# Patient Record
Sex: MALE | Race: WHITE | Employment: OTHER | URBAN - NONMETROPOLITAN AREA
[De-identification: names, ages, dates, MRNs, and addresses within clinical notes are randomized per-mention and may not be internally consistent; named-entity substitution may affect disease eponyms.]

---

## 2017-01-06 ENCOUNTER — OFFICE VISIT (OUTPATIENT)
Dept: FAMILY MEDICINE CLINIC | Age: 72
End: 2017-01-06

## 2017-01-06 VITALS
SYSTOLIC BLOOD PRESSURE: 126 MMHG | WEIGHT: 261.4 LBS | DIASTOLIC BLOOD PRESSURE: 82 MMHG | OXYGEN SATURATION: 98 % | TEMPERATURE: 97.8 F | HEART RATE: 80 BPM | RESPIRATION RATE: 17 BRPM | HEIGHT: 77 IN | BODY MASS INDEX: 30.86 KG/M2

## 2017-01-06 DIAGNOSIS — Z01.818 PREOP TESTING: ICD-10-CM

## 2017-01-06 DIAGNOSIS — R97.20 ELEVATED PSA: Primary | ICD-10-CM

## 2017-01-06 PROCEDURE — 99213 OFFICE O/P EST LOW 20 MIN: CPT | Performed by: NURSE PRACTITIONER

## 2017-01-06 PROCEDURE — 93000 ELECTROCARDIOGRAM COMPLETE: CPT | Performed by: NURSE PRACTITIONER

## 2017-01-06 ASSESSMENT — ENCOUNTER SYMPTOMS
EYES NEGATIVE: 1
RESPIRATORY NEGATIVE: 1
GASTROINTESTINAL NEGATIVE: 1

## 2017-03-14 ENCOUNTER — HOSPITAL ENCOUNTER (OUTPATIENT)
Dept: MRI IMAGING | Age: 72
Discharge: HOME | End: 2017-03-14
Attending: RADIOLOGY | Admitting: RADIOLOGY

## 2017-03-14 DIAGNOSIS — C61 PROSTATE CANCER (HCC): ICD-10-CM

## 2017-03-14 DIAGNOSIS — C61 MALIGNANT NEOPLASM OF PROSTATE (HCC): ICD-10-CM

## 2017-03-14 LAB
GFR AFRICAN AMERICAN: >60
GFR NON-AFRICAN AMERICAN: >60
PERFORMED ON: NORMAL
POC CREATININE: 1.1 MG/DL (ref 0.8–1.3)
POC SAMPLE TYPE: NORMAL

## 2017-04-25 ENCOUNTER — TELEPHONE (OUTPATIENT)
Dept: OTHER | Facility: CLINIC | Age: 72
End: 2017-04-25

## 2018-01-04 ENCOUNTER — HOSPITAL ENCOUNTER (OUTPATIENT)
Dept: CT IMAGING | Age: 73
Discharge: HOME | End: 2018-01-04
Admitting: FAMILY MEDICINE

## 2018-01-04 DIAGNOSIS — F17.210 CIGARETTE NICOTINE DEPENDENCE, UNCOMPLICATED: ICD-10-CM

## 2018-01-04 DIAGNOSIS — F17.210 CIGARETTE SMOKER: ICD-10-CM

## 2018-03-19 PROBLEM — N23 RENAL COLIC ON RIGHT SIDE: Status: ACTIVE | Noted: 2018-03-19

## 2018-12-07 ENCOUNTER — HOSPITAL ENCOUNTER (OUTPATIENT)
Dept: GENERAL RADIOLOGY | Age: 73
Discharge: HOME | End: 2018-12-07
Payer: COMMERCIAL

## 2018-12-07 ENCOUNTER — HOSPITAL ENCOUNTER (OUTPATIENT)
Age: 73
Discharge: HOME | End: 2018-12-07
Payer: COMMERCIAL

## 2018-12-07 DIAGNOSIS — M54.59 MECHANICAL LOW BACK PAIN: ICD-10-CM

## 2018-12-07 PROCEDURE — 72100 X-RAY EXAM L-S SPINE 2/3 VWS: CPT

## 2021-03-15 ENCOUNTER — HOSPITAL ENCOUNTER (OUTPATIENT)
Dept: NEUROLOGY | Age: 76
Discharge: HOME | End: 2021-03-15
Payer: COMMERCIAL

## 2021-03-15 PROCEDURE — 95910 NRV CNDJ TEST 7-8 STUDIES: CPT

## 2021-03-15 PROCEDURE — 95886 MUSC TEST DONE W/N TEST COMP: CPT

## 2021-03-15 NOTE — PROCEDURES
Conduction Findings: The above EMG needle exam was within normal limits. Nerve conduction studies demonstrate prolongation of both median sensory and left median motor distal latencies. There is borderline slowing of both ulnar nerves across elbows. Overall Impression: Study is consistent with bilateral carpal tunnel syndrome. Left moderate, right mild severity. No evidence of an acute radiculopathy or other lower motor neuron dysfunction  Thank you. Electronically signed by:  Edd Timmons DO,3/15/2021,8:40 AM

## 2021-11-12 ENCOUNTER — HOSPITAL ENCOUNTER (EMERGENCY)
Age: 76
Discharge: ANOTHER HOSPITAL | End: 2021-11-12
Attending: STUDENT IN AN ORGANIZED HEALTH CARE EDUCATION/TRAINING PROGRAM
Payer: COMMERCIAL

## 2021-11-12 ENCOUNTER — HOSPITAL ENCOUNTER (INPATIENT)
Age: 76
LOS: 3 days | Discharge: HOME | DRG: 066 | End: 2021-11-15
Attending: INTERNAL MEDICINE | Admitting: INTERNAL MEDICINE
Payer: COMMERCIAL

## 2021-11-12 ENCOUNTER — APPOINTMENT (OUTPATIENT)
Dept: GENERAL RADIOLOGY | Age: 76
End: 2021-11-12
Payer: COMMERCIAL

## 2021-11-12 ENCOUNTER — APPOINTMENT (OUTPATIENT)
Dept: CT IMAGING | Age: 76
End: 2021-11-12
Payer: COMMERCIAL

## 2021-11-12 VITALS
SYSTOLIC BLOOD PRESSURE: 146 MMHG | RESPIRATION RATE: 15 BRPM | BODY MASS INDEX: 29.47 KG/M2 | OXYGEN SATURATION: 95 % | HEART RATE: 61 BPM | HEIGHT: 76 IN | WEIGHT: 242 LBS | TEMPERATURE: 98.8 F | DIASTOLIC BLOOD PRESSURE: 67 MMHG

## 2021-11-12 DIAGNOSIS — I63.9 CEREBROVASCULAR ACCIDENT (CVA), UNSPECIFIED MECHANISM (HCC): Primary | ICD-10-CM

## 2021-11-12 DIAGNOSIS — I10 HTN (HYPERTENSION), BENIGN: Primary | ICD-10-CM

## 2021-11-12 DIAGNOSIS — R51.9 ACUTE NONINTRACTABLE HEADACHE, UNSPECIFIED HEADACHE TYPE: ICD-10-CM

## 2021-11-12 PROBLEM — G45.9 TIA (TRANSIENT ISCHEMIC ATTACK): Status: ACTIVE | Noted: 2021-11-12

## 2021-11-12 LAB
A/G RATIO: 1.7 (ref 1.1–2.2)
ALBUMIN SERPL-MCNC: 4.3 G/DL (ref 3.4–5)
ALP BLD-CCNC: 68 U/L (ref 40–129)
ALT SERPL-CCNC: 20 U/L (ref 10–40)
ANION GAP SERPL CALCULATED.3IONS-SCNC: 11 MMOL/L (ref 3–16)
AST SERPL-CCNC: 16 U/L (ref 15–37)
BASE EXCESS VENOUS: -1 MMOL/L (ref -3–3)
BASOPHILS ABSOLUTE: 0 K/UL (ref 0–0.2)
BASOPHILS RELATIVE PERCENT: 0.6 %
BETA-HYDROXYBUTYRATE: 0.1 MMOL/L (ref 0–0.27)
BILIRUB SERPL-MCNC: 1.2 MG/DL (ref 0–1)
BILIRUBIN URINE: NEGATIVE
BLOOD, URINE: NEGATIVE
BUN BLDV-MCNC: 19 MG/DL (ref 7–20)
CALCIUM SERPL-MCNC: 9.4 MG/DL (ref 8.3–10.6)
CARBOXYHEMOGLOBIN: 1.5 % (ref 0–1.5)
CHLORIDE BLD-SCNC: 104 MMOL/L (ref 99–110)
CHP ED QC CHECK: NORMAL
CLARITY: CLEAR
CO2: 25 MMOL/L (ref 21–32)
COLOR: YELLOW
CREAT SERPL-MCNC: 1 MG/DL (ref 0.8–1.3)
EKG ATRIAL RATE: 69 BPM
EKG DIAGNOSIS: NORMAL
EKG P AXIS: 54 DEGREES
EKG P-R INTERVAL: 206 MS
EKG Q-T INTERVAL: 398 MS
EKG QRS DURATION: 100 MS
EKG QTC CALCULATION (BAZETT): 426 MS
EKG R AXIS: -9 DEGREES
EKG T AXIS: 38 DEGREES
EKG VENTRICULAR RATE: 69 BPM
EOSINOPHILS ABSOLUTE: 0.1 K/UL (ref 0–0.6)
EOSINOPHILS RELATIVE PERCENT: 1.9 %
GFR AFRICAN AMERICAN: >60
GFR NON-AFRICAN AMERICAN: >60
GLUCOSE BLD-MCNC: 215 MG/DL
GLUCOSE BLD-MCNC: 215 MG/DL (ref 70–99)
GLUCOSE URINE: >=1000 MG/DL
HCO3 VENOUS: 25.6 MMOL/L (ref 23–29)
HCT VFR BLD CALC: 43.7 % (ref 40.5–52.5)
HEMOGLOBIN: 14.1 G/DL (ref 13.5–17.5)
INR BLD: 1.01 (ref 0.88–1.12)
KETONES, URINE: NEGATIVE MG/DL
LEUKOCYTE ESTERASE, URINE: NEGATIVE
LYMPHOCYTES ABSOLUTE: 0.8 K/UL (ref 1–5.1)
LYMPHOCYTES RELATIVE PERCENT: 13.4 %
MCH RBC QN AUTO: 26.6 PG (ref 26–34)
MCHC RBC AUTO-ENTMCNC: 32.2 G/DL (ref 31–36)
MCV RBC AUTO: 82.8 FL (ref 80–100)
METHEMOGLOBIN VENOUS: 0.3 %
MICROSCOPIC EXAMINATION: ABNORMAL
MONOCYTES ABSOLUTE: 0.4 K/UL (ref 0–1.3)
MONOCYTES RELATIVE PERCENT: 6.5 %
NEUTROPHILS ABSOLUTE: 4.9 K/UL (ref 1.7–7.7)
NEUTROPHILS RELATIVE PERCENT: 77.6 %
NITRITE, URINE: NEGATIVE
O2 CONTENT, VEN: 15 VOL %
O2 SAT, VEN: 67 %
O2 THERAPY: ABNORMAL
PCO2, VEN: 49.7 MMHG (ref 40–50)
PDW BLD-RTO: 15.3 % (ref 12.4–15.4)
PH UA: 5 (ref 5–8)
PH VENOUS: 7.33 (ref 7.35–7.45)
PLATELET # BLD: 184 K/UL (ref 135–450)
PMV BLD AUTO: 8 FL (ref 5–10.5)
PO2, VEN: 37.5 MMHG (ref 25–40)
POTASSIUM REFLEX MAGNESIUM: 4.3 MMOL/L (ref 3.5–5.1)
PRO-BNP: 113 PG/ML (ref 0–449)
PROTEIN UA: NEGATIVE MG/DL
PROTHROMBIN TIME: 11.4 SEC (ref 9.9–12.7)
RBC # BLD: 5.28 M/UL (ref 4.2–5.9)
SARS-COV-2, NAAT: NOT DETECTED
SODIUM BLD-SCNC: 140 MMOL/L (ref 136–145)
SPECIFIC GRAVITY UA: 1.02 (ref 1–1.03)
TCO2 CALC VENOUS: 27 MMOL/L
TOTAL PROTEIN: 6.9 G/DL (ref 6.4–8.2)
TROPONIN: <0.01 NG/ML
URINE REFLEX TO CULTURE: ABNORMAL
URINE TYPE: ABNORMAL
UROBILINOGEN, URINE: 0.2 E.U./DL
WBC # BLD: 6.3 K/UL (ref 4–11)

## 2021-11-12 PROCEDURE — 85610 PROTHROMBIN TIME: CPT

## 2021-11-12 PROCEDURE — 85025 COMPLETE CBC W/AUTO DIFF WBC: CPT

## 2021-11-12 PROCEDURE — 81003 URINALYSIS AUTO W/O SCOPE: CPT

## 2021-11-12 PROCEDURE — 93010 ELECTROCARDIOGRAM REPORT: CPT | Performed by: INTERNAL MEDICINE

## 2021-11-12 PROCEDURE — 84484 ASSAY OF TROPONIN QUANT: CPT

## 2021-11-12 PROCEDURE — 6370000000 HC RX 637 (ALT 250 FOR IP): Performed by: NURSE PRACTITIONER

## 2021-11-12 PROCEDURE — 6370000000 HC RX 637 (ALT 250 FOR IP): Performed by: INTERNAL MEDICINE

## 2021-11-12 PROCEDURE — 82010 KETONE BODYS QUAN: CPT

## 2021-11-12 PROCEDURE — 87635 SARS-COV-2 COVID-19 AMP PRB: CPT

## 2021-11-12 PROCEDURE — 71046 X-RAY EXAM CHEST 2 VIEWS: CPT

## 2021-11-12 PROCEDURE — 93005 ELECTROCARDIOGRAM TRACING: CPT | Performed by: NURSE PRACTITIONER

## 2021-11-12 PROCEDURE — 80053 COMPREHEN METABOLIC PANEL: CPT

## 2021-11-12 PROCEDURE — 99285 EMERGENCY DEPT VISIT HI MDM: CPT

## 2021-11-12 PROCEDURE — 70450 CT HEAD/BRAIN W/O DYE: CPT

## 2021-11-12 PROCEDURE — 82803 BLOOD GASES ANY COMBINATION: CPT

## 2021-11-12 PROCEDURE — 83880 ASSAY OF NATRIURETIC PEPTIDE: CPT

## 2021-11-12 RX ORDER — ONDANSETRON 4 MG/1
4 TABLET, ORALLY DISINTEGRATING ORAL EVERY 8 HOURS PRN
Status: DISCONTINUED | OUTPATIENT
Start: 2021-11-12 | End: 2021-11-15 | Stop reason: HOSPADM

## 2021-11-12 RX ORDER — ATORVASTATIN CALCIUM 20 MG/1
20 TABLET, FILM COATED ORAL NIGHTLY
Status: DISCONTINUED | OUTPATIENT
Start: 2021-11-12 | End: 2021-11-13

## 2021-11-12 RX ORDER — POLYETHYLENE GLYCOL 3350 17 G/17G
17 POWDER, FOR SOLUTION ORAL DAILY PRN
Status: DISCONTINUED | OUTPATIENT
Start: 2021-11-12 | End: 2021-11-15 | Stop reason: HOSPADM

## 2021-11-12 RX ORDER — ASPIRIN 81 MG/1
81 TABLET ORAL DAILY
Status: DISCONTINUED | OUTPATIENT
Start: 2021-11-13 | End: 2021-11-15 | Stop reason: HOSPADM

## 2021-11-12 RX ORDER — ONDANSETRON 2 MG/ML
4 INJECTION INTRAMUSCULAR; INTRAVENOUS EVERY 6 HOURS PRN
Status: DISCONTINUED | OUTPATIENT
Start: 2021-11-12 | End: 2021-11-15 | Stop reason: HOSPADM

## 2021-11-12 RX ORDER — ASPIRIN 300 MG/1
300 SUPPOSITORY RECTAL DAILY
Status: DISCONTINUED | OUTPATIENT
Start: 2021-11-13 | End: 2021-11-15 | Stop reason: HOSPADM

## 2021-11-12 RX ORDER — OXYCODONE HYDROCHLORIDE AND ACETAMINOPHEN 5; 325 MG/1; MG/1
1 TABLET ORAL ONCE
Status: COMPLETED | OUTPATIENT
Start: 2021-11-12 | End: 2021-11-12

## 2021-11-12 RX ORDER — ACETAMINOPHEN 325 MG/1
650 TABLET ORAL ONCE
Status: COMPLETED | OUTPATIENT
Start: 2021-11-12 | End: 2021-11-12

## 2021-11-12 RX ORDER — EMPAGLIFLOZIN 10 MG/1
10 TABLET, FILM COATED ORAL DAILY
COMMUNITY

## 2021-11-12 RX ADMIN — OXYCODONE HYDROCHLORIDE AND ACETAMINOPHEN 1 TABLET: 5; 325 TABLET ORAL at 14:01

## 2021-11-12 RX ADMIN — ACETAMINOPHEN 650 MG: 325 TABLET ORAL at 12:01

## 2021-11-12 RX ADMIN — ATORVASTATIN CALCIUM 20 MG: 20 TABLET, FILM COATED ORAL at 23:54

## 2021-11-12 ASSESSMENT — PAIN DESCRIPTION - PAIN TYPE
TYPE: ACUTE PAIN
TYPE: ACUTE PAIN

## 2021-11-12 ASSESSMENT — PAIN SCALES - GENERAL
PAINLEVEL_OUTOF10: 5
PAINLEVEL_OUTOF10: 4
PAINLEVEL_OUTOF10: 0
PAINLEVEL_OUTOF10: 4

## 2021-11-12 ASSESSMENT — ENCOUNTER SYMPTOMS
ABDOMINAL PAIN: 0
DIARRHEA: 0
VOMITING: 0
NAUSEA: 0
SORE THROAT: 0
COLOR CHANGE: 0
SHORTNESS OF BREATH: 0
RHINORRHEA: 0

## 2021-11-12 ASSESSMENT — PAIN DESCRIPTION - DESCRIPTORS: DESCRIPTORS: POUNDING;THROBBING

## 2021-11-12 ASSESSMENT — PAIN DESCRIPTION - ORIENTATION: ORIENTATION: LEFT

## 2021-11-12 ASSESSMENT — PAIN DESCRIPTION - LOCATION: LOCATION: HEAD

## 2021-11-12 ASSESSMENT — PAIN DESCRIPTION - FREQUENCY: FREQUENCY: CONTINUOUS

## 2021-11-12 NOTE — PLAN OF CARE
Patient acceptance note    Received a call from transfer center around 12:40 PM in regards to Mr. Chasity Barkley. As per report from Jayne Hein NP, patient presented to [facility] ED with complaints of memory impairment. Per report, patient complained of occipital headache last night and went to bed. Woke up today morning and complained of memory issues while trying to turn his TV on. Upon presentation to ED at [facility], NIH stroke scale of zero, symptoms resolved. Labs unremarkable. CT head without contrast and CTA head/neck within normal limits. Patient is being transferred to [facility] for neurology consult to rule out TIA. Accepted patient to inpatient med/surg on telemetry.     Dr. Rajeev Shoemaker MD

## 2021-11-12 NOTE — ED NOTES
Dr. Mariposa Pinto returned call to [facility], RN  11/12/21 1242    Dr. Francia Simental accepted [facility] [address] [address]       [place], ARIN  11/12/21 2312

## 2021-11-12 NOTE — ED PROVIDER NOTES
[address] ED  EMERGENCY DEPARTMENT ENCOUNTER        Pt Name: Donnell Abdalla  MRN: [id number]  [place] 1945  Date of evaluation: 11/12/2021  Provider: ZULEYKA Feliciano CNP  PCP: Tamiko Palacio MD  ED Attending: Jonah Thomas MD    CHIEF COMPLAINT       Chief Complaint   Patient presents with    Altered Mental Status     Pt. woke up this morning and was not able to remember pt. info. Pt. could remember how to use a remote. Pt. states has a headache when he went to bed and when he woke up it had moved to a different location. HISTORY OF PRESENT ILLNESS   (Location/Symptom, Timing/Onset, Context/Setting, Quality, Duration, Modifying Factors, Severity)  Note limiting factors. Donnell Abdalla is a 76 y.o. male for concern for change in mental status. Onset was today. Context includes patient reports yesterday that he had a headache to the posterior aspect of his head. Patient states he woke up this morning with a headache behind his left eye. Patient states that he attempted to use his tablet which he uses every day to play a game and was having difficulty using the tablet. Patient's symptoms have resolved. Patient states his last known well was last night when he went to bed. Alleviating factors include nothing. Aggravating factors include nothing. Pain is 4/10. Nothing has been used for pain today. Nursing Notes were all reviewed and agreed with or any disagreements were addressed  in the HPI. REVIEW OF SYSTEMS  (2-9 systems for level 4, 10 or more for level 5)     Review of Systems   Constitutional: Negative for fever. HENT: Negative for congestion, rhinorrhea and sore throat. Respiratory: Negative for shortness of breath. Cardiovascular: Negative for chest pain. Gastrointestinal: Negative for abdominal pain, diarrhea, nausea and vomiting. Genitourinary: Negative for decreased urine volume and difficulty urinating.    Musculoskeletal: Use    Smoking status: Former Smoker     Types: Cigars    Smokeless tobacco: Never Used   Vaping Use    Vaping Use: Never used   Substance and Sexual Activity    Alcohol use: No    Drug use: No    Sexual activity: Yes     Partners: Female     Birth control/protection: Post-menopausal   Other Topics Concern    None   Social History Narrative    None     Social Determinants of Health     Financial Resource Strain:     Difficulty of Paying Living Expenses: Not on file   Food Insecurity:     Worried About Running Out of Food in the Last Year: Not on file    Babak of Food in the Last Year: Not on file   Transportation Needs:     Lack of Transportation (Medical): Not on file    Lack of Transportation (Non-Medical): Not on file   Physical Activity:     Days of Exercise per Week: Not on file    Minutes of Exercise per Session: Not on file   Stress:     Feeling of Stress : Not on file   Social Connections:     Frequency of Communication with Friends and Family: Not on file    Frequency of Social Gatherings with Friends and Family: Not on file    Attends [employer] Services: Not on file    Active Member of [employer] or Organizations: Not on file    Attends Club or Organization Meetings: Not on file    Marital Status: Not on file   Intimate Partner Violence:     Fear of Current or Ex-Partner: Not on file    Emotionally Abused: Not on file    Physically Abused: Not on file    Sexually Abused: Not on file   Housing Stability:     Unable to Pay for Housing in the Last Year: Not on file    Number of [place] in the Last Year: Not on file    Unstable Housing in the Last Year: Not on file       SCREENINGS   NIH Stroke Scale  Interval: Baseline  Level of Consciousness (1a. ): Alert  LOC Questions (1b. ):  Answers both correctly  LOC Commands (1c. ): Performs both tasks correctly  Best Gaze (2. ): Normal  Visual (3. ): No visual loss  Facial Palsy (4. ): Normal symmetrical movement  Motor Arm, Left (5a. ): No drift  Motor Arm, Right (5b. ): No drift  Motor Leg, Left (6a. ): No drift  Motor Leg, Right (6b. ): No drift  Limb Ataxia (7. ): Absent  Sensory (8. ): Normal  Best Language (9. ): No aphasia  Dysarthria (10. ): Normal  Extinction and Inattention (11): No abnormality  Total: 0Glasgow Coma Scale  Eye Opening: Spontaneous  Best Verbal Response: Oriented  Best Motor Response: Obeys commands  Antoinette Coma Scale Score: 15        PHYSICAL EXAM    (up to 7 for level 4, 8 ormore for level 5)     ED Triage Vitals [11/12/21 0934]   BP Temp Temp Source Pulse Resp SpO2 Height Weight   (!) 154/82 98.8 °F (37.1 °C) Oral 70 16 97 % 6' 4\" (1.93 m) 242 lb (109.8 kg)       Physical Exam  Constitutional:       Appearance: He is well-developed. HENT:      Head: Normocephalic and atraumatic. Cardiovascular:      Rate and Rhythm: Normal rate. Pulmonary:      Effort: Pulmonary effort is normal. No respiratory distress. Abdominal:      General: There is no distension. Palpations: Abdomen is soft. Musculoskeletal:         General: Normal range of motion. Cervical back: Normal range of motion. Skin:     General: Skin is warm and dry. Neurological:      General: No focal deficit present. Mental Status: He is alert and oriented to person, place, and time. GCS: GCS eye subscore is 4. GCS verbal subscore is 5. GCS motor subscore is 6. Cranial Nerves: No cranial nerve deficit or dysarthria. Sensory: No sensory deficit. Motor: No weakness.       Coordination: Coordination normal.      Gait: Gait normal.      Deep Tendon Reflexes: Reflexes normal.         DIAGNOSTIC RESULTS   LABS:    Labs Reviewed   CBC WITH AUTO DIFFERENTIAL - Abnormal; Notable for the following components:       Result Value    Lymphocytes Absolute 0.8 (*)     All other components within normal limits    Narrative:     Performed at:  [facility] - [facility] [address],  [place], [facility]   Phone [phone]   COMPREHENSIVE METABOLIC PANEL W/ REFLEX TO MG FOR LOW K - Abnormal; Notable for the following components:    Glucose 215 (*)     Total Bilirubin 1.2 (*)     All other components within normal limits    Narrative:     Performed at:  [facility] [address],  [place], [place]   Phone [phone]   URINE RT REFLEX TO CULTURE - Abnormal; Notable for the following components:    Glucose, Ur >=1000 (*)     All other components within normal limits    Narrative:     Performed at:  [facility]) - [facility] [address],  [place], [place]   Phone [phone]   BLOOD GAS, VENOUS - Abnormal; Notable for the following components:    pH, Walt 7.330 (*)     All other components within normal limits    Narrative:     Performed at:  [facility] [address],  [place], [place]   Phone [phone]   POCT GLUCOSE - Normal   COVID-19, RAPID    Narrative:     Performed at:  [place] [address],  [place], [place]   Phone [phone]   TROPONIN    Narrative:     Performed at:  [place] [address],  [place], [place]   Phone [phone]   BRAIN NATRIURETIC PEPTIDE    Narrative:     Performed at:  [facility] [address],  [place], [place]   Phone [phone]   BETA-HYDROXYBUTYRATE    Narrative:     Performed at:  [place] [address],  [place], [place]   Phone [phone]   PROTIME-INR    Narrative:     Performed at:  [facility]) [facility] [address],  [place], [place]   Phone [phone]       All other labs were within normal range or not returned as of this dictation. EKG:  All EKG's are interpreted by the Emergency Department Physician who either signs or Co-signs this chart in the absence of a cardiologist.  Please see their note for interpretation of EKG. RADIOLOGY:   Head CT interpreted by radiologist for    FINDINGS:   BRAIN/VENTRICLES: There is no acute intracranial hemorrhage, mass effect or   midline shift.  No abnormal extra-axial fluid collection.  The gray-white   differentiation is maintained without evidence of an acute infarct.  There is   no evidence of hydrocephalus. There are mild atrophic and ischemic white   matter changes. ORBITS: The visualized portion of the orbits demonstrate no acute abnormality. SINUSES: There is patchy left ethmoid opacification.  There is moderate left   maxillary sinus mucosal thickening.  There is a small right maxillary sinus   mucous retention cyst.     SOFT TISSUES/SKULL:  No acute abnormality of the visualized skull or soft   tissues. Chest x-ray interpreted by radiologist for    FINDINGS:   The lungs are without acute focal process.  There is no effusion or   pneumothorax. The cardiomediastinal silhouette is stable. The osseous   structures are stable. Interpretation per the Radiologist below, if available at the time of this note:    CT HEAD WO CONTRAST   Final Result   No acute intracranial abnormality. Paranasal sinus inflammatory disease         XR CHEST (2 VW)   Final Result   No acute process. XR CHEST (2 VW)    Result Date: 11/12/2021  EXAMINATION: TWO XRAY VIEWS OF THE CHEST 11/12/2021 10:35 am COMPARISON: 12/06/2016 HISTORY: ORDERING SYSTEM PROVIDED HISTORY: Chest Discomfort TECHNOLOGIST PROVIDED HISTORY: Reason for exam:->Chest Discomfort Reason for Exam: chest discomfort Acuity: Acute Type of Exam: Initial FINDINGS: The lungs are without acute focal process. There is no effusion or pneumothorax. The cardiomediastinal silhouette is stable. The osseous structures are stable. No acute process.      CT HEAD WO CONTRAST    Result Date: 11/12/2021  EXAMINATION: CT OF THE HEAD WITHOUT CONTRAST Temp:       TempSrc:       SpO2: 95% 99% 96% 95%   Weight:       Height:           Patient was given the following medications:  Medications   acetaminophen (TYLENOL) tablet 650 mg (650 mg Oral Given 11/12/21 1201)   oxyCODONE-acetaminophen (PERCOCET) 5-325 MG per tablet 1 tablet (1 tablet Oral Given 11/12/21 1401)       Patient was seen and evaluated by myself and Mikal Massey. Patient here today for concerns for altered mental status. Family reports that he was normal yesterday and his mentation however today when he woke up he attempted to use the remote control for the TV and his tablet in could not figure out how to operate the items that he typically uses. Patient states his symptoms have resolved. His last known normal was last night. On exam he is awake and alert hemodynamically stable nontoxic in appearance. He is neurologically intact. Lab values have been reviewed and interpreted. Head CT was reviewed as well. Consult was placed to the hospitalist for transfer. [facility] has accepted the patient for transfer. Patient's care will be transferred to [facility]. The patient tolerated their visit well. They were seen and evaluated by the attending physician, Kali Newby MD who agreed with the assessment and plan. The patient and / or the family were informed of the results of any tests, a time was given to answer questions, a plan was proposed and they agreed with plan. FINAL IMPRESSION      1. Cerebrovascular accident (CVA), unspecified mechanism ([facility] [address]          DISPOSITION/PLAN   DISPOSITION Decision To Transfer 11/12/2021 11:42:16 AM      PATIENT REFERRED TO:  No follow-up provider specified.     DISCHARGE MEDICATIONS:  New Prescriptions    No medications on file       DISCONTINUED MEDICATIONS:  Discontinued Medications    No medications on file              (Please note that portions of this note were completed with a voice recognition program.  Efforts were made to edit the dictations but occasionally words are mis-transcribed.)    ZULEYKA Chatman CNP (electronically signed)       ZULEYKA Chatman CNP  11/12/21 ZULEYKA Hyde CNP  11/12/21 1925

## 2021-11-12 NOTE — ED TRIAGE NOTES
Pt brought in by family for new onset of confusion this am. Has posterior HA last night, now frontal headache.

## 2021-11-12 NOTE — ED PROVIDER NOTES
I independently examined and evaluated Rivka Hwang. In brief, patient is a 68-year-old male, presenting with concerns for headache and altered mental status that has since improved. Patient states that when he woke up this morning he noted that he was confused, went to use his tablet and could not remember what buttons pressure had a place again. Reportedly was confused per family and answering questions inappropriately as well. He also states that he is having a headache that travels from the back to the front of his head, states is not as apparent on the back of his head but does radiate to the front and is at times severe. He denies any fevers. Denies any neck pain or neck stiffness. He denies any other complaints or concerns at this time. Focused exam revealed patient resting comfortably, no acute distress. Heart regular rate and rhythm. Lungs clear to auscultation bilaterally. Abdomen soft, nondistended, nontender. No neck pain or neck stiffness, no meningismus. Cranial nerves II through XII grossly intact bilaterally. No sensory deficits. Normal finger-nose and heel-to-shin. Strength 5 out of 5 in bilateral upper and lower extremities.     Labs Reviewed   CBC WITH AUTO DIFFERENTIAL - Abnormal; Notable for the following components:       Result Value    Lymphocytes Absolute 0.8 (*)     All other components within normal limits    Narrative:     Performed at:  [facility] [place] [address],  [place], [facility]   Phone [phone]   BLOOD GAS, VENOUS - Abnormal; Notable for the following components:    pH, Walt 7.330 (*)     All other components within normal limits    Narrative:     Performed at:  [facility]) - [facility] [address],  [place], [facility]   Phone [phone], RAPID   COMPREHENSIVE METABOLIC PANEL W/ REFLEX TO MG FOR LOW K   URINE RT REFLEX TO CULTURE   TROPONIN   BRAIN NATRIURETIC PEPTIDE BETA-HYDROXYBUTYRATE   POCT GLUCOSE     CT HEAD WO CONTRAST   Final Result   No acute intracranial abnormality. Paranasal sinus inflammatory disease         XR CHEST (2 VW)   Final Result   No acute process. The Ekg interpreted by me shows  normal sinus rhythm with a rate of 69  Axis is   Normal  QTc is  normal  Intervals and Durations are unremarkable. ST Segments: nonspecific changes  No previous available for comparison    ED course: Patient is a 80-year-old male, presenting with concerns for headache and transient altered mental status. Patient is seen in conjunction with NP Alexander Das, please refer to her note for further details of the patient's work-up and treatment. Patient given Tylenol for headache. CT head showed some paranasal sinus inflammatory disease but no acute intracranial abnormality. Labs are otherwise reassuring. Given concern for potential TIA, decision was made to hospitalize the patient for further work-up and treatment of his condition. Findings were discussed the patient is comfortable and in agreement with plan of care. All diagnostic, treatment, and disposition decisions were made by myself in conjunction with the advanced practice provider. For all further details of the patient's emergency department visit, please see the advanced practice provider's documentation. 1. Cerebrovascular accident (CVA), unspecified mechanism ([facility] [address]      Comment: Please note this report has been produced using speech recognition software and may contain errors related to that system including errors in grammar, punctuation, and spelling, as well as words and phrases that may be inappropriate. If there are any questions or concerns please feel free to contact the dictating provider for clarification.        Toñito Barrett MD  11/12/21 8069

## 2021-11-12 NOTE — ED NOTES
Call placed to [facility] for transfer to Neurology bed     Ayesha Gutierrez RN  11/12/21 3979

## 2021-11-13 ENCOUNTER — APPOINTMENT (OUTPATIENT)
Dept: MRI IMAGING | Age: 76
DRG: 066 | End: 2021-11-13
Attending: INTERNAL MEDICINE
Payer: COMMERCIAL

## 2021-11-13 ENCOUNTER — APPOINTMENT (OUTPATIENT)
Dept: CT IMAGING | Age: 76
DRG: 066 | End: 2021-11-13
Attending: INTERNAL MEDICINE
Payer: COMMERCIAL

## 2021-11-13 PROBLEM — I63.9 ACUTE CVA (CEREBROVASCULAR ACCIDENT) (HCC): Status: ACTIVE | Noted: 2021-11-13

## 2021-11-13 LAB
ANION GAP SERPL CALCULATED.3IONS-SCNC: 10 MMOL/L (ref 3–16)
BASOPHILS ABSOLUTE: 0.1 K/UL (ref 0–0.2)
BASOPHILS RELATIVE PERCENT: 0.9 %
BUN BLDV-MCNC: 16 MG/DL (ref 7–20)
CALCIUM SERPL-MCNC: 9.2 MG/DL (ref 8.3–10.6)
CHLORIDE BLD-SCNC: 108 MMOL/L (ref 99–110)
CHOLESTEROL, TOTAL: 103 MG/DL (ref 0–199)
CO2: 25 MMOL/L (ref 21–32)
CREAT SERPL-MCNC: 0.9 MG/DL (ref 0.8–1.3)
EOSINOPHILS ABSOLUTE: 0.2 K/UL (ref 0–0.6)
EOSINOPHILS RELATIVE PERCENT: 2.9 %
ESTIMATED AVERAGE GLUCOSE: 137 MG/DL
GFR AFRICAN AMERICAN: >60
GFR NON-AFRICAN AMERICAN: >60
GLUCOSE BLD-MCNC: 115 MG/DL (ref 70–99)
GLUCOSE BLD-MCNC: 125 MG/DL (ref 70–99)
GLUCOSE BLD-MCNC: 126 MG/DL (ref 70–99)
GLUCOSE BLD-MCNC: 136 MG/DL (ref 70–99)
GLUCOSE BLD-MCNC: 170 MG/DL (ref 70–99)
HBA1C MFR BLD: 6.4 %
HCT VFR BLD CALC: 41.8 % (ref 40.5–52.5)
HDLC SERPL-MCNC: 43 MG/DL (ref 40–60)
HEMOGLOBIN: 13.2 G/DL (ref 13.5–17.5)
LDL CHOLESTEROL CALCULATED: 48 MG/DL
LYMPHOCYTES ABSOLUTE: 1.6 K/UL (ref 1–5.1)
LYMPHOCYTES RELATIVE PERCENT: 21.9 %
MCH RBC QN AUTO: 25.8 PG (ref 26–34)
MCHC RBC AUTO-ENTMCNC: 31.7 G/DL (ref 31–36)
MCV RBC AUTO: 81.4 FL (ref 80–100)
MONOCYTES ABSOLUTE: 0.6 K/UL (ref 0–1.3)
MONOCYTES RELATIVE PERCENT: 9 %
NEUTROPHILS ABSOLUTE: 4.7 K/UL (ref 1.7–7.7)
NEUTROPHILS RELATIVE PERCENT: 65.3 %
PDW BLD-RTO: 15 % (ref 12.4–15.4)
PERFORMED ON: ABNORMAL
PLATELET # BLD: 182 K/UL (ref 135–450)
PMV BLD AUTO: 7.8 FL (ref 5–10.5)
POTASSIUM REFLEX MAGNESIUM: 4.4 MMOL/L (ref 3.5–5.1)
RBC # BLD: 5.14 M/UL (ref 4.2–5.9)
SODIUM BLD-SCNC: 143 MMOL/L (ref 136–145)
TRIGL SERPL-MCNC: 60 MG/DL (ref 0–150)
VLDLC SERPL CALC-MCNC: 12 MG/DL
WBC # BLD: 7.2 K/UL (ref 4–11)

## 2021-11-13 PROCEDURE — 92526 ORAL FUNCTION THERAPY: CPT

## 2021-11-13 PROCEDURE — 85025 COMPLETE CBC W/AUTO DIFF WBC: CPT

## 2021-11-13 PROCEDURE — 70450 CT HEAD/BRAIN W/O DYE: CPT

## 2021-11-13 PROCEDURE — 80061 LIPID PANEL: CPT

## 2021-11-13 PROCEDURE — G0378 HOSPITAL OBSERVATION PER HR: HCPCS

## 2021-11-13 PROCEDURE — 70551 MRI BRAIN STEM W/O DYE: CPT

## 2021-11-13 PROCEDURE — 92610 EVALUATE SWALLOWING FUNCTION: CPT

## 2021-11-13 PROCEDURE — 6360000002 HC RX W HCPCS: Performed by: INTERNAL MEDICINE

## 2021-11-13 PROCEDURE — 97161 PT EVAL LOW COMPLEX 20 MIN: CPT

## 2021-11-13 PROCEDURE — 97116 GAIT TRAINING THERAPY: CPT

## 2021-11-13 PROCEDURE — 83036 HEMOGLOBIN GLYCOSYLATED A1C: CPT

## 2021-11-13 PROCEDURE — 96372 THER/PROPH/DIAG INJ SC/IM: CPT

## 2021-11-13 PROCEDURE — 6370000000 HC RX 637 (ALT 250 FOR IP): Performed by: NURSE PRACTITIONER

## 2021-11-13 PROCEDURE — 2580000003 HC RX 258: Performed by: NURSE PRACTITIONER

## 2021-11-13 PROCEDURE — 70498 CT ANGIOGRAPHY NECK: CPT

## 2021-11-13 PROCEDURE — 99223 1ST HOSP IP/OBS HIGH 75: CPT | Performed by: PSYCHIATRY & NEUROLOGY

## 2021-11-13 PROCEDURE — 6360000004 HC RX CONTRAST MEDICATION: Performed by: NURSE PRACTITIONER

## 2021-11-13 PROCEDURE — 2060000000 HC ICU INTERMEDIATE R&B

## 2021-11-13 PROCEDURE — 6370000000 HC RX 637 (ALT 250 FOR IP): Performed by: INTERNAL MEDICINE

## 2021-11-13 PROCEDURE — 36415 COLL VENOUS BLD VENIPUNCTURE: CPT

## 2021-11-13 PROCEDURE — 80048 BASIC METABOLIC PNL TOTAL CA: CPT

## 2021-11-13 RX ORDER — INSULIN LISPRO 100 [IU]/ML
0-6 INJECTION, SOLUTION INTRAVENOUS; SUBCUTANEOUS
Status: DISCONTINUED | OUTPATIENT
Start: 2021-11-13 | End: 2021-11-15 | Stop reason: HOSPADM

## 2021-11-13 RX ORDER — SODIUM CHLORIDE 0.9 % (FLUSH) 0.9 %
5-40 SYRINGE (ML) INJECTION PRN
Status: DISCONTINUED | OUTPATIENT
Start: 2021-11-13 | End: 2021-11-15 | Stop reason: HOSPADM

## 2021-11-13 RX ORDER — NICOTINE POLACRILEX 4 MG
15 LOZENGE BUCCAL PRN
Status: DISCONTINUED | OUTPATIENT
Start: 2021-11-13 | End: 2021-11-15 | Stop reason: HOSPADM

## 2021-11-13 RX ORDER — DEXTROSE MONOHYDRATE 25 G/50ML
12.5 INJECTION, SOLUTION INTRAVENOUS PRN
Status: DISCONTINUED | OUTPATIENT
Start: 2021-11-13 | End: 2021-11-15 | Stop reason: HOSPADM

## 2021-11-13 RX ORDER — SODIUM CHLORIDE 0.9 % (FLUSH) 0.9 %
5-40 SYRINGE (ML) INJECTION 2 TIMES DAILY
Status: DISCONTINUED | OUTPATIENT
Start: 2021-11-13 | End: 2021-11-15 | Stop reason: HOSPADM

## 2021-11-13 RX ORDER — DEXTROSE MONOHYDRATE 50 MG/ML
100 INJECTION, SOLUTION INTRAVENOUS PRN
Status: DISCONTINUED | OUTPATIENT
Start: 2021-11-13 | End: 2021-11-15 | Stop reason: HOSPADM

## 2021-11-13 RX ORDER — INSULIN LISPRO 100 [IU]/ML
0-3 INJECTION, SOLUTION INTRAVENOUS; SUBCUTANEOUS NIGHTLY
Status: DISCONTINUED | OUTPATIENT
Start: 2021-11-13 | End: 2021-11-15 | Stop reason: HOSPADM

## 2021-11-13 RX ORDER — ATORVASTATIN CALCIUM 40 MG/1
40 TABLET, FILM COATED ORAL NIGHTLY
Status: DISCONTINUED | OUTPATIENT
Start: 2021-11-13 | End: 2021-11-15 | Stop reason: HOSPADM

## 2021-11-13 RX ADMIN — SODIUM CHLORIDE, PRESERVATIVE FREE 10 ML: 5 INJECTION INTRAVENOUS at 08:43

## 2021-11-13 RX ADMIN — Medication 10 ML: at 08:40

## 2021-11-13 RX ADMIN — INSULIN LISPRO 1 UNITS: 100 INJECTION, SOLUTION INTRAVENOUS; SUBCUTANEOUS at 12:26

## 2021-11-13 RX ADMIN — ENOXAPARIN SODIUM 40 MG: 100 INJECTION SUBCUTANEOUS at 08:32

## 2021-11-13 RX ADMIN — Medication 10 ML: at 21:22

## 2021-11-13 RX ADMIN — ATORVASTATIN CALCIUM 40 MG: 40 TABLET, FILM COATED ORAL at 21:22

## 2021-11-13 RX ADMIN — ASPIRIN 81 MG: 81 TABLET, COATED ORAL at 08:33

## 2021-11-13 RX ADMIN — IOPAMIDOL 75 ML: 755 INJECTION, SOLUTION INTRAVENOUS at 08:46

## 2021-11-13 ASSESSMENT — PAIN SCALES - GENERAL
PAINLEVEL_OUTOF10: 0

## 2021-11-13 NOTE — H&P
[place] [address]                     [facility], [address]                              HISTORY AND PHYSICAL    PATIENT NAME: Lucas Haywood                   :        1945  MED REC NO:   [id number]                          ROOM:       6537  ACCOUNT NO:   [de-identified]                           ADMIT DATE: 2021  PROVIDER:     Mahi Kramer MD    DATE OF SERVICE:  I examined the patient on the medical floor on  2021. CHIEF COMPLAINT:  \"I couldn't speak properly and I couldn't think  properly. \"    HISTORY OF PRESENT ILLNESS:  A 51-year-old  male who was at  home states that all of a sudden he just felt like he could not \"speak  properly\" and he was having difficulty with using his electronic tablet  and he was just unable to do things that normally he can do without  difficulty. He told this to his wife and she decided to bring him to  the hospital.  En route to the hospital when his symptoms started  improving, he started having a headache which was actually more severe  traveling from back to the front and got more intense as his  neurological symptoms were improving. At the time of my exam, the  patient states that his symptoms have completely gone away and he is not  having any headache or any other clinical symptoms whatsoever. PAST MEDICAL HISTORY:  1. Type 2 diabetes mellitus. 2.  Hypertension. 3.  Dyslipidemia. PAST SURGICAL HISTORY:  No major recent surgical procedures. ALLERGIC HISTORY:  No known drug allergies. FAMILY HISTORY:  Reviewed by me and is currently noncontributory. SOCIAL HISTORY:  Lives at home. Nonsmoker. Stopped smoking three years  ago. No illicit substance use. MEDICATIONS:  Jardiance, Amaryl, Glucophage, Zocor, Humalog. REVIEW OF SYSTEMS:  The patient's review of systems is significant for  the speech difficulties and per the history of present illness.   All  other systems reviewed and are negative except for the history of  present illness. PHYSICAL EXAMINATION:  VITAL SIGNS:  Temperature 98.2, respiratory rate 12, pulse 64, blood  pressure 161/80, saturating 98%. CNS:  The patient is alert, awake and oriented currently. The patient  does not have any focal neurological signs. The patient does not have  any cerebellar signs. The patient's power is 5/5 in all four  extremities. PSYCH:  The patient is cooperative, answering questions appropriately. EYES:  Pupils are reactive to light. Extraocular muscle movements are  intact. RESPIRATORY SYSTEM:  Clear to auscultation. CVS:  S1, S2 are heard. No murmurs, gallops or rubs are noted. ABDOMEN:  Nondistended. MUSCULOSKELETAL:  No acute obvious deformities. SKIN:  Appears well hydrated. DIAGNOSTIC DATA:  Sodium 140, potassium 4.3, BUN is 19, creatinine 1.0. Troponin less than 0.01. White count of 6.3, hemoglobin 14.1.  UA  negative for infection. CT of the head without contrast shows no acute  intracranial abnormality. CONSULTATIONS:  Neurology. ASSESSMENT:  1. Acute transient ischemic attack. 2.  Type 2 diabetes. 3.  Hypertension. 4.  Dyslipidemia. PLAN OF CARE:  The patient is admitted to Internal Medicine Service to  observation. Telemetry monitoring will be continued. Low dose aspirin  has been initiated. Neurology consult has been requested. The patient  will need an MRI of the brain without contrast.  I believe this can be  done as an outpatient. We will follow consultant recommendations with  respect to the timing of the MRI. DVT prophylaxis with Lovenox. CODE STATUS:  Full. EXPECTED LENGTH OF STAY:  Less than two midnights based on plan of care  above. RISK:  High due to the patient's presentation with the TIA. DISPOSITION:  Observation telemetry.         Nallely Lane MD    D: 11/13/2021 1:40:11       T: 11/13/2021 1:44:57     [email or web address]  Job#: [id number]     Doc#: [id number]    CC:

## 2021-11-13 NOTE — ED NOTES
Patient going to bed [phone] at [place]. Call report to [phone].  [facility] ETA 1430     Swati Do  11/12/21 2018

## 2021-11-13 NOTE — ED NOTES
Pt stable for transfer to [facility]. Writer reviewed benefits of transfer; pt verbalized understanding and signed EMTALA trasnfer form. Writer performed NIHSS  at handoff with transfer team [facility]. Pt exited unit via stretcher under no duress. Writer called phone report to [facility], who assumes care when pt arrives at [facility].       Demetria Mata RN  11/12/21 9810

## 2021-11-13 NOTE — PROGRESS NOTES
Facility/Department: [address]  Initial Assessment  DYSPHAGIA BEDSIDE SWALLOW EVALUATION     Patient: Zeke Rueda   : 1945   MRN: [id number]      Evaluation Date: 2021   Admitting Diagnosis: TIA (transient ischemic attack) [G45.9]  Acute CVA (cerebrovascular accident) ([facility] [address] [I63.9]  Pain: Denied at time of Eval                        H&P:   HISTORY OF PRESENT ILLNESS:  A 75-year-old  male who was at  home states that all of a sudden he just felt like he could not \"speak  properly\" and he was having difficulty with using his electronic tablet  and he was just unable to do things that normally he can do without  difficulty. He told this to his wife and she decided to bring him to  the hospital.  En route to the hospital when his symptoms started  improving, he started having a headache which was actually more severe  traveling from back to the front and got more intense as his  neurological symptoms were improving. At the time of my exam, the  patient states that his symptoms have completely gone away and he is not  having any headache or any other clinical symptoms whatsoever. Recent Chest xray: \"No acute process. \"    Recent MRI Brain:  1. Tiny areas of acute to subacute infarct involving the left parietal and   left occipital lobes.  No significant mass effect or midline shift. 2. Otherwise, no acute intracranial abnormality. 3. Mild global parenchymal volume loss with mild chronic microvascular   ischemic changes. Modified Barium Swallow Study: N/A    History/Prior Level of Function:   Living Status: Lives with wife    Prior Dysphagia History: None noted    Dysphagia Impressions/Diagnosis: Mild Oropharyngeal Dysphagia   Pt seen this date sitting up in bed alert and independently oriented x4. Pt denies any swallowing difficulties at this time. Pt seen with RN present.  Pt appears emotionally labile at times throughout evaluation (tearful and over the top jokey/laughing intermittently). Pt would most likely benefit from full speech-language/cognitive evaluation at a later time. Successive thin liquid trials revealed mildly delayed swallow initiation with minimal pharyngeal pooling suspected prior to the initiation of a swallow. Adequate oral manipulation and A-P propulsion noted with hard solid masticated trials. No overt clinical s/s of aspiration noted with any trials this date. Recommended Diet and Intervention 11/13/2021:  Diet Solids Recommendation:  Regular diet  Liquid Consistency Recommendation: Thin liquids Recommended form of Meds:   Whole with water     Compensatory Swallowing Strategies: Alternate solids/liquids , Upright as possible with all PO intake , Swallow 2 times per bite , Remain upright 30-45 min     SHORT TERM DYSPHAGIA GOALS/PLAN OF CARE: Speech therapy for dysphagia tx 3-5 times per week during acute care stay. 1. Pt will functionally tolerate recommended diet with no overt clinical s/s of aspiration  2. Pt will demonstrate understanding of aspiration risk and precautions via education/demonstration with occasional prompting  3.  If clinical s/s of aspiration/penetration continue to be noted, Pt will participate in Modified Barium Swallow Study     Dysphagia Therapeutic Intervention:  Diet Tolerance Monitoring , Patient/Family Education     Discharge Recommendations: Recommend ongoing SLP for dysphagia and speech therapy upon discharge from hospital     Patient Positioning: Upright in bed    Current Diet Level (prior to evaluation): NPO pending SLP evaluation    Respiratory Status:   [x]Room Air   []O2 via nasal cannula   []Other:    Dentition:  [x]Adequate  []Dentures   []Missing Many Teeth  []Edentulous  []Other:    Baseline Vocal Quality:  [x]Normal  []Dysphonic   []Aphonic   []Hoarse  []Wet  []Weak  []Other:    Volitional Cough:  [x]Strong  []Weak  []Wet  []Absent  []Congested  []Other:    Volitional Swallow:   []Absent []Delayed     [x]Adequate     []Required use of drink     Oral Mechanism Exam:  [x]WFL []Mild   [] Moderate  []Severe  []To be assessed  Impaired:   []Left side      []Right side    []Labial ROM/Coordination    []Labial Symmetry   []Lingual ROM/Coordination   []Lingual Symmetry  []Gag  []Other:     Oral Phase: [x]WFL []Mild   [] Moderate  []Severe  []To be assessed   []Impaired/Prolonged Mastication:   []Spillage Left:   []Spillage Right:  []Pocketing Left:   []Pocketing Right:   []Decreased Anterior to Posterior Transit:   []Suspected Premature Bolus Loss:   []Lingual/Palatal Residue:   []Other:     Pharyngeal Phase: []WFL [x]Mild   [] Moderate  []Severe  []To be assessed   [x]Delayed Swallow:   []Suspected Pharyngeal Pooling:   []Decreased Laryngeal Elevation:   []Absent Swallow:  []Wet Vocal Quality:   []Throat Clearing-Immediate:   []Throat Clearing-Delayed:   []Cough-Immediate:   []Cough-Delayed:  []Change in Vital Signs:  []Suspected Delayed Pharyngeal Clearing:  []Other:       Eating Assistance:  [x]Independent  []Setup or clean-up assistance   [] Supervision or touching assistance   [] Partial or moderate assistance   [] Substantial or maximal assistance  [] Dependent     EDUCATION:   Provided education regarding role of SLP, results of assessment, recommendations and general speech pathology plan of care. [x] Pt verbalized understanding and agreement   [] Pt requires ongoing learning   [] No evidence of comprehension     If patient discharges prior to next visit, this note will serve as discharge.      Timed Code Minutes: 0 minutes  Total Treatment Minutes: 25 minutes    Electronically signed by:    Isaak Ness MS, [id number] [facility] [id number]   Speech-Language Pathologist

## 2021-11-13 NOTE — PROGRESS NOTES
Physical Therapy    Facility/Department: [address]  Initial Assessment/Discharge Summary    NAME: Eleazar Chew  : 1945  MRN: [id number]    Date of Service: 2021    Discharge Recommendations:    Eleazar Cehw scored a 24/24 on the AM-PAC short mobility form. At this time, no further PT is recommended upon discharge due to presenting at baseline. Recommend patient returns to prior setting with prior services. PT Equipment Recommendations  Equipment Needed: No    Assessment   Assessment: Pt presents at independent baseline level of mobility. Pt will therefore be discharged from skilled PT at this time. Decision Making: Low Complexity  PT Education: PT Role; Plan of Care  Patient Education: DC recommendations; Pt voices concerns with stroke sx returning- educated on other sings/sx of stroke- Pt verbalized understanding. REQUIRES PT FOLLOW UP: No  Activity Tolerance  Activity Tolerance: Patient Tolerated treatment well  Activity Tolerance: No SOB noted       Patient Diagnosis(es): There were no encounter diagnoses. has a past medical history of Cancer ([address] Hemorrhoids, external, Hepatitis, Hernia of unspecified site of abdominal cavity without mention of obstruction or gangrene, Kidney disease, Measles, and Type II or unspecified type diabetes mellitus without mention of complication, not stated as uncontrolled. has a past surgical history that includes Nasal polyp surgery and Cystocopy (Right, 2018). Restrictions  Position Activity Restriction  Other position/activity restrictions: UAT  Vision/Hearing  Vision: Impaired  Vision Exceptions: Wears glasses at all times  Hearing: Exceptions to [facility]  Hearing Exceptions: Bilateral hearing aid (does not have at hospital - wife bringing back)     Subjective  General  Additional Pertinent Hx: Admitted with AMS, altered speech, and headache.  MRI showing \"tiny areas of acute to subacute infarct involving the left parietal and left occipital lobes\". CT showing no acute abnomalities with infarct seen on MRI not well visualized. General Comment  Comments: Pt supine in bed upon approach and agreeable to PT. Pt denies pain. Reports fatigue d/t lack of sleep. Reports emotional lability has decreased since yesterday.   Pain Screening  Patient Currently in Pain: Denies          Orientation  Orientation  Overall Orientation Status: Within Functional Limits  Social/Functional History  Social/Functional History  Lives With: Spouse  Type of Home: Apartment (senior care apartment)  Home Layout: One level  Home Access: Level entry  Bathroom Shower/Tub: Walk-in shower  Bathroom Toilet: Handicap height  Bathroom Equipment: Shower chair, Hand-held shower (carries in everytime he uses it)  Bathroom Accessibility: Accessible  Home Equipment: Cane (walking stick)  ADL Assistance: Independent  Homemaking Assistance: Independent  Homemaking Responsibilities: No (wife completes)  Ambulation Assistance: Independent (no AD)  Transfer Assistance: Independent  Occupation: Part time employment  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  [address]: rides bike  Additional Comments: denies falls in last 6 months- reports one slip on ice 3 yrs ago  Cognition        Objective          AROM RLE (degrees)  RLE AROM: WFL  AROM LLE (degrees)  LLE AROM : WFL  Strength RLE  Strength RLE: WFL  Comment: 5/5 seated MMTs with exception of 4+/5 hip flexion- pt reports this is his baseline d/t being L side dominant  Strength LLE  Strength LLE: WFL  Comment: 5/5 globally seated MMTs  Tone RLE  RLE Tone: Normotonic  Tone LLE  LLE Tone: Normotonic  Motor Control  Gross Motor?: WFL  Coordination  Rapid Alternating Movements: Normal  Heel to Shin: Normal  Sensation  Overall Sensation Status: WFL (denies N/T)  Bed mobility  Supine to Sit: Independent  Sit to Supine: Independent  Comment: HOB flat  Transfers  Sit to Stand: Independent (from EOB without AD)  Stand to sit:  Independent (to EOB without AD)  Ambulation  Ambulation?: Yes  Ambulation 1  Surface: level tile  Device: No Device  Assistance: Independent  Quality of Gait: slight M/L sway with decreased arm swing, moderate wagner  Comments: No LOB noted, pt reports ambulating at baseline     Balance  Sitting - Static: Good  Sitting - Dynamic: Good  Standing - Static: Good  Standing - Dynamic: Good  Comments: indp for all seated and standing balance        Plan   Plan  Times per week: dcd  Safety Devices  Type of devices: Left in bed, Nurse notified (pt cleared to be up ad medhat in room and in hallway post conversation with RN)    G-Code       OutComes Score                                                  AM-PAC Score  AM-PAC Inpatient Mobility Raw Score : 24 (11/13/21 1136)  AM-PAC Inpatient T-Scale Score : 61.14 (11/13/21 1136)  Mobility Inpatient CMS 0-100% Score: 0 (11/13/21 1136)  Mobility Inpatient CMS G-Code Modifier : CH (11/13/21 1136)          Goals  Short term goals  Time Frame for Short term goals: none d/t dcd       Therapy Time   Individual Concurrent Group Co-treatment   Time In 1058         Time Out 1128         Minutes 30         Timed Code Treatment Minutes: Via Abhinav Fitzpatrick [address] [place] [id number]

## 2021-11-13 NOTE — PROGRESS NOTES
Hospitalist Progress Note      PCP: Lana Hercules MD    Date of Admission: 11/12/2021    Chief Complaint: Difficulty speaking and concentrating    Hospital Course:  A 79-year-old  male who was at  home states that all of a sudden he just felt like he could not \"speak  properly\" and he was having difficulty with using his electronic tablet  and he was just unable to do things that normally he can do without  difficulty. He told this to his wife and she decided to bring him to  the hospital.  En route to the hospital when his symptoms started  improving, he started having a headache which was actually more severe  traveling from back to the front and got more intense as his  neurological symptoms were improving. At the time of my exam, the  patient states that his symptoms have completely gone away and he is not  having any headache or any other clinical symptoms whatsoever. Subjective: Pt laying in bed. Reports increase in emotions. Reviewed MRI results, verbalized understanding. Denies chest pain shortness of breath palpitations abdominal pain nausea vomiting diarrhea dysuria headache lightheadedness or dizziness. Reviewed plan of care, denied further needs or questions. Reviewed plan of care with wife on the phone, denied further needs or questions. Updated wife Naldo Fernandez on phone, reviewed MRI of brain results, verbalized understanding, denies needs.     Assessment/Plan:    Acute left-sided CVA  -Continue aspirin 81 mg daily  -Change statin to high intensity atorvastatin 40 mg daily  -MRI confirmed acute and subacute stroke to left occipital and parietal lobes  -CTA head and neck did not demonstrate any high-grade atherosclerotic disease  -Echo is pending, to be done Monday  -Continue telemetry, will likely need heart monitor at discharge  -Neurology consulted  -Lipid panel and A1c pending  -Patient notes A1c had been elevated but once he was started on Jardiance it improved to less than Cranial nerves: II-XII intact, grossly non-focal.  Psychiatric: Alert and oriented, thought content appropriate, normal insight, emotional lability  Capillary Refill: Brisk,< 3 seconds   Peripheral Pulses: +2 palpable, equal bilaterally       Labs:   Recent Labs     11/12/21  1105 11/13/21  0450   WBC 6.3 7.2   HGB 14.1 13.2*   HCT 43.7 41.8    182     Recent Labs     11/12/21  1105 11/13/21  0450    143   K 4.3 4.4    108   CO2 25 25   BUN 19 16   CREATININE 1.0 0.9   CALCIUM 9.4 9.2     Recent Labs     11/12/21  1105   AST 16   ALT 20   BILITOT 1.2*   ALKPHOS 68     Recent Labs     11/12/21  1156   INR 1.01     Recent Labs     11/12/21  1105   TROPONINI <0.01       Urinalysis:      Lab Results   Component Value Date    NITRU Negative 11/12/2021    WBCUA 0-2 03/19/2018    BACTERIA 1+ 03/19/2018    RBCUA >100 03/19/2018    BLOODU Negative 11/12/2021    SPECGRAV 1.020 11/12/2021    GLUCOSEU >=1000 11/12/2021       Radiology:  CTA HEAD NECK W CONTRAST   Final Result   1. No CT evidence of acute intracranial abnormality. 2. The small infarcts seen on the prior MRI are not well visualized on this   exam.   3. 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 4. Otherwise, no significant stenosis of the cervical carotid/vertebral   arteries or [id number]. CT HEAD WO CONTRAST   Final Result   1. No CT evidence of acute intracranial abnormality. 2. The small infarcts seen on the prior MRI are not well visualized on this   exam.   3. 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 4. Otherwise, no significant stenosis of the cervical carotid/vertebral   arteries or [id number]. MRI brain without contrast   Final Result   1. Tiny areas of acute to subacute infarct involving the left parietal and   left occipital lobes.   No significant mass effect or midline shift. 2. Otherwise, no acute intracranial abnormality. 3. Mild global parenchymal volume loss with mild chronic microvascular   ischemic changes. These results were sent to the [facility] [address] ([facility]) on   11/13/2021 at 7:25 am to be communicated to the referring/covering health   care provider/office. DVT Prophylaxis: Lovenox  Diet: ADULT DIET; Regular; 4 carb choices (60 gm/meal)  Code Status: Full Code    PT/OT Eval Status: Eval was ordered    Dispo -home once medically stable    Levora Fossa, APRN - CNP      NOTE:  This report was transcribed using voice recognition software. Every effort was made to ensure accuracy; however, inadvertent computerized transcription errors may be present.

## 2021-11-13 NOTE — PROGRESS NOTES
Pt returned to unit at 0709 via transport. Shift handoff NIHSS performed, score-0. MRI resulted, spoke with Jose E Cottrell at [facility], hospitalist notified of results and pt request for diet order via perfect [employer], awaiting orders at this time. Call light within reach, no s/s of distress noted at this time.

## 2021-11-13 NOTE — CONSULTS
In patient Neurology consult        [facility] Neurology      MD Grisel Olivo Yehudadiana  1945    Date of Service: 11/13/2021    Referring Physician: Cornelio Wood MD      Reason for the consult and CC: Acute visual change and new stroke    HPI:   The patient is a 76y.o.  years old male with history of diabetes and hypertension who was admitted to the hospital yesterday with new onset confusion visual changes. Symptoms started few hours prior to admission. He was at home when he had some sudden difficulties with basics of his ADL at home including playing with his electronic tablet and visual disturbance from right side. Degree was severe. Duration was persistent. No weakness or numbness or headache or chest pain. No dysphagia. He was having some mild word finding difficulties. No other relieving or aggravating factors. He came to the ED for evaluation. Initial work-up with neuroimaging showed no acute stroke or large vessel occlusion. He was admitted  Patient had MRI of the brain this morning which did show acute left hemispheric CVA affecting the parieto-occipital region. He feels back to his baseline today. Other review of system was unremarkable.       Family History   Problem Relation Age of Onset    Diabetes Mother     Stroke Mother     Stroke Father      Past Surgical History:   Procedure Laterality Date    CYSTOSCOPY Right 03/19/2018     cysto, right ureteroscopy, holmium laser and stone removal, stent placement 6 x 24 JJ    NASAL POLYP SURGERY          Past Medical History:   Diagnosis Date    Cancer [facility])     prostate cancer    Hemorrhoids, external     Hepatitis     Hernia of unspecified site of abdominal cavity without mention of obstruction or gangrene     Kidney disease     Measles     Type II or unspecified type diabetes mellitus without mention of complication, not stated as uncontrolled      Social History     Tobacco Use    Smoking status: Former Smoker     Types: Cigars    Smokeless tobacco: Never Used   Vaping Use    Vaping Use: Never used   Substance Use Topics    Alcohol use: No    Drug use: No     No Known Allergies  Current Facility-Administered Medications   Medication Dose Route Frequency Provider Last Rate Last Admin    atorvastatin (LIPITOR) tablet 40 mg  40 mg Oral Nightly Laverne RUEDA Nicolee, APRN - CNP        glucose (GLUTOSE) 40 % oral gel 15 g  15 g Oral PRN George Kendrick, APRN - CNP        dextrose 50 % IV solution  12.5 g IntraVENous PRN [facility], APRN - CNP        glucagon (rDNA) injection 1 mg  1 mg IntraMUSCular PRN [facility], APRN - CNP        dextrose 5 % solution  100 mL/hr IntraVENous PRN [facility], APRN - CNP        insulin lispro (1 Unit Dial) 0-6 Units  0-6 Units SubCUTAneous TID WC [facility], APRN - CNP   1 Units at 11/13/21 1226    insulin lispro (1 Unit Dial) 0-3 Units  0-3 Units SubCUTAneous Nightly George Kendrick, APRN - CNP        sodium chloride flush 0.9 % injection 5-40 mL  5-40 mL IntraVENous BID [facility], APRN - CNP   10 mL at 11/13/21 0840    sodium chloride flush 0.9 % injection 5-40 mL  5-40 mL IntraVENous PRN George Kendrick, APRN - CNP   10 mL at 11/13/21 0843    ondansetron (ZOFRAN-ODT) disintegrating tablet 4 mg  4 mg Oral Q8H PRN Berto Mabry MD        Or    ondansetron (ZOFRAN) injection 4 mg  4 mg IntraVENous Q6H PRN Berto Mabry MD        polyethylene glycol (GLYCOLAX) packet 17 g  17 g Oral Daily PRN Berto Mabry MD        enoxaparin (LOVENOX) injection 40 mg  40 mg SubCUTAneous Daily Berto Mabry MD   40 mg at 11/13/21 0832    aspirin EC tablet 81 mg  81 mg Oral Daily Berto Mabry MD   81 mg at 11/13/21 9003    Or    aspirin suppository 300 mg  300 mg Rectal Daily Berto Mabry MD           ROS : A 10-14 system review of constitutional, cardiovascular, respiratory, eyes, musculoskeletal, endocrine, GI, ENT, skin, hematological, genitourinary, psychiatric and neurologic systems was obtained and updated today and is unremarkable except as mentioned in my HPI      Exam:     Constitutional:   Vitals:    11/13/21 0354 11/13/21 0725 11/13/21 0952 11/13/21 1215   BP: (!) 161/78 (!) 150/77  (!) 147/81   Pulse: 65 59 58 78   Resp: 16 16  16   Temp: 98.1 °F (36.7 °C) 97.6 °F (36.4 °C)  97.8 °F (36.6 °C)   TempSrc: Oral Oral  Axillary   SpO2: 96% 98%  96%   Weight:       Height:           General appearance:  Normal development and appear in no acute distress. Eye:  Fundus of the eye: Funduscopic examination cannot be performed due to COVID19 restrictions and precautions. Neck: supple  Cardiovascular: No lower leg edema with good pulsation. Mental Status:   Oriented to person, place, problem, and time. Memory: Good immediate recall. Intact remote memory  Normal attention span and concentration. Language: intact naming, repeating and fluency   Good fund of Knowledge. Aware of current events and vocabulary   Cranial Nerves:   II: Visual fields: Full. Pupils: equal, round, reactive to light  III,IV,VI: Extra Ocular Movements are intact. No nystagmus  V: Facial sensation is intact   VII: Facial strength and movements: intact and symmetric  VIII: Hearing: Intact  IX: Palate elevation is symmetric  XI: Shoulder shrug is intact  XII: Tongue movements are normal  Musculoskeletal: 5/5 in all 4 extremities. Tone: Normal tone. Reflexes: 2+ in the upper extremity and 1+ in the lower extremity   Planters: flexor bilaterally. Coordination: no pronator drift, no dysmetria with FNF in upper extremities. Normal REM. Sensation: normal to all modalities in both arms and legs.   Gait/Posture: steady gait    Data:  LABS:   Lab Results   Component Value Date     11/13/2021    K 4.4 11/13/2021     11/13/2021    CO2 25 11/13/2021    BUN 16 11/13/2021    CREATININE 0.9 11/13/2021    GFRAA >60 11/13/2021    LABGLOM >60 11/13/2021    GLUCOSE 125 11/13/2021    CALCIUM 9.2 11/13/2021     Lab Results   Component Value Date    WBC 7.2 11/13/2021    RBC 5.14 11/13/2021    HGB 13.2 11/13/2021    HCT 41.8 11/13/2021    MCV 81.4 11/13/2021    RDW 15.0 11/13/2021     11/13/2021     Lab Results   Component Value Date    INR 1.01 11/12/2021    PROTIME 11.4 11/12/2021       Neuroimaging were independently reviewed by me and discussed results with the patient and his wife over the phone  Reviewed notes from different physicians  Reviewed lab and blood testing    Impression:  Acute left hemispheric CVA, thromboembolic versus cardioembolic  Hypertension, not controlled  Diabetes  Hyperlipidemia    Recommendation:  Echocardiogram  Lipid panel  Aspirin  Statin  Telemetry  PT OT  Continue home blood pressure medications  Blood pressure control  Insulin sliding scale  A1c  Stroke education, secondary prevention and risk of recurrence were discussed with the patient and his wife today  Consider event monitor with PCP outpatient for 4 weeks to exclude possibility of paroxysmal A. fib  Can be discharged from neurology once medically stable    MDM high due to high risk for stroke recurrence. Thank you for referring such patient. If you have any questions regarding my consult note, please don't hesitate to call me. Denice Jeans, MD  [phone]    This dictation was generated by voice recognition computer software.  Although all attempts are made to edit the dictation for accuracy, there may be errors in the  transcription that are not intended

## 2021-11-13 NOTE — ED NOTES
Writer took report from [employer]. Writer assumes care at this time.       Malika Galvez RN  11/12/21 2007

## 2021-11-14 LAB
ANION GAP SERPL CALCULATED.3IONS-SCNC: 10 MMOL/L (ref 3–16)
BASOPHILS ABSOLUTE: 0.1 K/UL (ref 0–0.2)
BASOPHILS RELATIVE PERCENT: 0.9 %
BUN BLDV-MCNC: 16 MG/DL (ref 7–20)
CALCIUM SERPL-MCNC: 9.2 MG/DL (ref 8.3–10.6)
CHLORIDE BLD-SCNC: 106 MMOL/L (ref 99–110)
CO2: 24 MMOL/L (ref 21–32)
CREAT SERPL-MCNC: 0.9 MG/DL (ref 0.8–1.3)
EOSINOPHILS ABSOLUTE: 0.2 K/UL (ref 0–0.6)
EOSINOPHILS RELATIVE PERCENT: 2.7 %
GFR AFRICAN AMERICAN: >60
GFR NON-AFRICAN AMERICAN: >60
GLUCOSE BLD-MCNC: 137 MG/DL (ref 70–99)
GLUCOSE BLD-MCNC: 142 MG/DL (ref 70–99)
GLUCOSE BLD-MCNC: 143 MG/DL (ref 70–99)
GLUCOSE BLD-MCNC: 153 MG/DL (ref 70–99)
GLUCOSE BLD-MCNC: 185 MG/DL (ref 70–99)
HCT VFR BLD CALC: 45 % (ref 40.5–52.5)
HEMOGLOBIN: 14.6 G/DL (ref 13.5–17.5)
LYMPHOCYTES ABSOLUTE: 1 K/UL (ref 1–5.1)
LYMPHOCYTES RELATIVE PERCENT: 17.5 %
MCH RBC QN AUTO: 26.4 PG (ref 26–34)
MCHC RBC AUTO-ENTMCNC: 32.4 G/DL (ref 31–36)
MCV RBC AUTO: 81.3 FL (ref 80–100)
MONOCYTES ABSOLUTE: 0.4 K/UL (ref 0–1.3)
MONOCYTES RELATIVE PERCENT: 7.2 %
NEUTROPHILS ABSOLUTE: 4.2 K/UL (ref 1.7–7.7)
NEUTROPHILS RELATIVE PERCENT: 71.7 %
PDW BLD-RTO: 15.2 % (ref 12.4–15.4)
PERFORMED ON: ABNORMAL
PLATELET # BLD: 180 K/UL (ref 135–450)
PMV BLD AUTO: 7.8 FL (ref 5–10.5)
POTASSIUM REFLEX MAGNESIUM: 4.1 MMOL/L (ref 3.5–5.1)
RBC # BLD: 5.54 M/UL (ref 4.2–5.9)
SODIUM BLD-SCNC: 140 MMOL/L (ref 136–145)
WBC # BLD: 5.8 K/UL (ref 4–11)

## 2021-11-14 PROCEDURE — 80048 BASIC METABOLIC PNL TOTAL CA: CPT

## 2021-11-14 PROCEDURE — G0378 HOSPITAL OBSERVATION PER HR: HCPCS

## 2021-11-14 PROCEDURE — 6370000000 HC RX 637 (ALT 250 FOR IP): Performed by: NURSE PRACTITIONER

## 2021-11-14 PROCEDURE — 6370000000 HC RX 637 (ALT 250 FOR IP): Performed by: INTERNAL MEDICINE

## 2021-11-14 PROCEDURE — 36415 COLL VENOUS BLD VENIPUNCTURE: CPT

## 2021-11-14 PROCEDURE — 85025 COMPLETE CBC W/AUTO DIFF WBC: CPT

## 2021-11-14 PROCEDURE — 2060000000 HC ICU INTERMEDIATE R&B

## 2021-11-14 PROCEDURE — 2580000003 HC RX 258: Performed by: NURSE PRACTITIONER

## 2021-11-14 PROCEDURE — 96372 THER/PROPH/DIAG INJ SC/IM: CPT

## 2021-11-14 PROCEDURE — 6360000002 HC RX W HCPCS: Performed by: INTERNAL MEDICINE

## 2021-11-14 RX ADMIN — ATORVASTATIN CALCIUM 40 MG: 40 TABLET, FILM COATED ORAL at 21:41

## 2021-11-14 RX ADMIN — INSULIN LISPRO 1 UNITS: 100 INJECTION, SOLUTION INTRAVENOUS; SUBCUTANEOUS at 21:39

## 2021-11-14 RX ADMIN — INSULIN LISPRO 1 UNITS: 100 INJECTION, SOLUTION INTRAVENOUS; SUBCUTANEOUS at 12:57

## 2021-11-14 RX ADMIN — Medication 10 ML: at 21:45

## 2021-11-14 RX ADMIN — Medication 10 ML: at 08:59

## 2021-11-14 RX ADMIN — ASPIRIN 81 MG: 81 TABLET, COATED ORAL at 08:59

## 2021-11-14 RX ADMIN — INSULIN LISPRO 1 UNITS: 100 INJECTION, SOLUTION INTRAVENOUS; SUBCUTANEOUS at 09:00

## 2021-11-14 RX ADMIN — ENOXAPARIN SODIUM 40 MG: 100 INJECTION SUBCUTANEOUS at 08:59

## 2021-11-14 ASSESSMENT — PAIN SCALES - GENERAL
PAINLEVEL_OUTOF10: 0

## 2021-11-14 NOTE — PROGRESS NOTES
Hospitalist Progress Note      PCP: Meche Shea MD    Date of Admission: 11/12/2021    Chief Complaint: Difficulty speaking and concentrating    Hospital Course:  A 75-year-old  male who was at  home states that all of a sudden he just felt like he could not \"speak  properly\" and he was having difficulty with using his electronic tablet  and he was just unable to do things that normally he can do without  difficulty. He told this to his wife and she decided to bring him to  the hospital.  En route to the hospital when his symptoms started  improving, he started having a headache which was actually more severe  traveling from back to the front and got more intense as his  neurological symptoms were improving. At the time of my exam, the  patient states that his symptoms have completely gone away and he is not  having any headache or any other clinical symptoms whatsoever. Subjective: Pt laying in bed. States he feels well. States he considers himself jillian for not having profound deficits from a stroke. Denies chest pain shortness of breath palpitations abdominal pain nausea vomiting diarrhea dysuria headache lightheadedness or dizziness. Reviewed plan of care, denied further needs or questions.       Assessment/Plan:    Acute left-sided CVA  -Continue aspirin 81 mg daily  -Change statin to high intensity atorvastatin 40 mg daily  -MRI confirmed acute and subacute stroke to left occipital and parietal lobes  -CTA head and neck did not demonstrate any high-grade atherosclerotic disease  -Echo is pending, to be done Monday  -Continue telemetry, will likely need heart monitor at discharge  -Neurology consulted  -Lipid panel total cholesterol 103, HDL 43, LDL 48, triglycerides 60  -Hemoglobin A1c 6.4  -Patient notes A1c had been elevated but once he was started on Jardiance it improved to less than 7  -Continue blood pressure control  -PT OT ST-scored well, does not require home assistance    NIDDM  -Continue sliding scale and fingerstick blood sugar AC at bedtime    Hypertension  -Allow permissive hypertension for now  -Monitor closely    Hyperlipidemia  -Awaiting lipid panel  -Continue atorvastatin      Active Hospital Problems    Diagnosis     Acute CVA (cerebrovascular accident) ([facility] [address] [I63.9]     DM type 2, controlled, with complication ([facility] [address] [U88.6]     Dyslipidemia [E78.5]     TIA (transient ischemic attack) [G45.9]     HTN (hypertension), benign [I10]        Medications:  Reviewed    Infusion Medications    dextrose       Scheduled Medications    atorvastatin  40 mg Oral Nightly    insulin lispro  0-6 Units SubCUTAneous TID WC    insulin lispro  0-3 Units SubCUTAneous Nightly    sodium chloride flush  5-40 mL IntraVENous BID    enoxaparin  40 mg SubCUTAneous Daily    aspirin  81 mg Oral Daily    Or    aspirin  300 mg Rectal Daily     PRN Meds: glucose, dextrose, glucagon (rDNA), dextrose, sodium chloride flush, ondansetron **OR** ondansetron, polyethylene glycol      Intake/Output Summary (Last 24 hours) at 11/14/2021 1324  Last data filed at 11/14/2021 0911  Gross per 24 hour   Intake 600 ml   Output --   Net 600 ml       Physical Exam Performed:    /84   Pulse 71   Temp 97.5 °F (36.4 °C) (Oral)   Resp 15   Ht 6' 4\" (1.93 m)   Wt 242 lb (109.8 kg)   SpO2 96%   BMI 29.46 kg/m²     General appearance: No apparent distress, appears stated age and cooperative. HEENT: Pupils equal, round, and reactive to light. Conjunctivae/corneas clear. Neck: Supple, with full range of motion. No jugular venous distention. Trachea midline. Respiratory:  Normal respiratory effort. Clear to auscultation, bilaterally without Rales/Wheezes/Rhonchi. Cardiovascular: Regular rate and rhythm with normal S1/S2 without murmurs, rubs or gallops. Abdomen: Soft, non-tender, non-distended with normal bowel sounds. Musculoskeletal: No clubbing, cyanosis or edema bilaterally.   Full Otherwise, no significant stenosis of the cervical carotid/vertebral   arteries or [id number]. MRI brain without contrast   Final Result   1. Tiny areas of acute to subacute infarct involving the left parietal and   left occipital lobes. No significant mass effect or midline shift. 2. Otherwise, no acute intracranial abnormality. 3. Mild global parenchymal volume loss with mild chronic microvascular   ischemic changes. These results were sent to the [facility] [id number] ([address] [place] [address]) on   11/13/2021 at 7:25 am to be communicated to the referring/covering health   care provider/office. DVT Prophylaxis: Lovenox  Diet: ADULT DIET; Regular; 4 carb choices (60 gm/meal)  Code Status: Full Code    PT/OT Eval Status: A.m. PAC score 24/24    Dispo -home once medically stable    ZULEYKA Brunson - CNP      NOTE:  This report was transcribed using voice recognition software. Every effort was made to ensure accuracy; however, inadvertent computerized transcription errors may be present.

## 2021-11-15 ENCOUNTER — APPOINTMENT (OUTPATIENT)
Dept: CT IMAGING | Age: 76
DRG: 066 | End: 2021-11-15
Attending: INTERNAL MEDICINE
Payer: COMMERCIAL

## 2021-11-15 VITALS
HEIGHT: 76 IN | HEART RATE: 78 BPM | OXYGEN SATURATION: 98 % | TEMPERATURE: 98.1 F | BODY MASS INDEX: 29.47 KG/M2 | SYSTOLIC BLOOD PRESSURE: 155 MMHG | RESPIRATION RATE: 16 BRPM | WEIGHT: 242 LBS | DIASTOLIC BLOOD PRESSURE: 82 MMHG

## 2021-11-15 DIAGNOSIS — I48.91 ATRIAL FIBRILLATION, UNSPECIFIED TYPE (HCC): Primary | ICD-10-CM

## 2021-11-15 LAB
ANION GAP SERPL CALCULATED.3IONS-SCNC: 9 MMOL/L (ref 3–16)
BASOPHILS ABSOLUTE: 0 K/UL (ref 0–0.2)
BASOPHILS RELATIVE PERCENT: 0.5 %
BUN BLDV-MCNC: 18 MG/DL (ref 7–20)
CALCIUM SERPL-MCNC: 9.1 MG/DL (ref 8.3–10.6)
CHLORIDE BLD-SCNC: 107 MMOL/L (ref 99–110)
CO2: 24 MMOL/L (ref 21–32)
CREAT SERPL-MCNC: 0.9 MG/DL (ref 0.8–1.3)
EOSINOPHILS ABSOLUTE: 0.2 K/UL (ref 0–0.6)
EOSINOPHILS RELATIVE PERCENT: 2.1 %
GFR AFRICAN AMERICAN: >60
GFR NON-AFRICAN AMERICAN: >60
GLUCOSE BLD-MCNC: 151 MG/DL (ref 70–99)
GLUCOSE BLD-MCNC: 155 MG/DL (ref 70–99)
GLUCOSE BLD-MCNC: 162 MG/DL (ref 70–99)
HCT VFR BLD CALC: 42.9 % (ref 40.5–52.5)
HEMOGLOBIN: 14.1 G/DL (ref 13.5–17.5)
LV EF: 58 %
LVEF MODALITY: NORMAL
LYMPHOCYTES ABSOLUTE: 1 K/UL (ref 1–5.1)
LYMPHOCYTES RELATIVE PERCENT: 13.4 %
MCH RBC QN AUTO: 26.6 PG (ref 26–34)
MCHC RBC AUTO-ENTMCNC: 32.9 G/DL (ref 31–36)
MCV RBC AUTO: 80.8 FL (ref 80–100)
MONOCYTES ABSOLUTE: 0.5 K/UL (ref 0–1.3)
MONOCYTES RELATIVE PERCENT: 7 %
NEUTROPHILS ABSOLUTE: 5.8 K/UL (ref 1.7–7.7)
NEUTROPHILS RELATIVE PERCENT: 77 %
PDW BLD-RTO: 15.2 % (ref 12.4–15.4)
PERFORMED ON: ABNORMAL
PERFORMED ON: ABNORMAL
PLATELET # BLD: 182 K/UL (ref 135–450)
PMV BLD AUTO: 7.2 FL (ref 5–10.5)
POTASSIUM REFLEX MAGNESIUM: 4 MMOL/L (ref 3.5–5.1)
RBC # BLD: 5.31 M/UL (ref 4.2–5.9)
SODIUM BLD-SCNC: 140 MMOL/L (ref 136–145)
WBC # BLD: 7.5 K/UL (ref 4–11)

## 2021-11-15 PROCEDURE — 70450 CT HEAD/BRAIN W/O DYE: CPT

## 2021-11-15 PROCEDURE — 96374 THER/PROPH/DIAG INJ IV PUSH: CPT

## 2021-11-15 PROCEDURE — G0378 HOSPITAL OBSERVATION PER HR: HCPCS

## 2021-11-15 PROCEDURE — 99233 SBSQ HOSP IP/OBS HIGH 50: CPT | Performed by: PSYCHIATRY & NEUROLOGY

## 2021-11-15 PROCEDURE — 85025 COMPLETE CBC W/AUTO DIFF WBC: CPT

## 2021-11-15 PROCEDURE — 6360000002 HC RX W HCPCS: Performed by: INTERNAL MEDICINE

## 2021-11-15 PROCEDURE — 6370000000 HC RX 637 (ALT 250 FOR IP): Performed by: INTERNAL MEDICINE

## 2021-11-15 PROCEDURE — 96372 THER/PROPH/DIAG INJ SC/IM: CPT

## 2021-11-15 PROCEDURE — 36415 COLL VENOUS BLD VENIPUNCTURE: CPT

## 2021-11-15 PROCEDURE — 93306 TTE W/DOPPLER COMPLETE: CPT

## 2021-11-15 PROCEDURE — 2580000003 HC RX 258: Performed by: NURSE PRACTITIONER

## 2021-11-15 PROCEDURE — 80048 BASIC METABOLIC PNL TOTAL CA: CPT

## 2021-11-15 RX ORDER — MORPHINE SULFATE 2 MG/ML
2 INJECTION, SOLUTION INTRAMUSCULAR; INTRAVENOUS
Status: DISCONTINUED | OUTPATIENT
Start: 2021-11-15 | End: 2021-11-15 | Stop reason: HOSPADM

## 2021-11-15 RX ORDER — LISINOPRIL 10 MG/1
10 TABLET ORAL DAILY
Qty: 30 TABLET | Refills: 0 | Status: SHIPPED
Start: 2021-11-15 | End: 2021-11-15 | Stop reason: HOSPADM

## 2021-11-15 RX ORDER — ATORVASTATIN CALCIUM 40 MG/1
40 TABLET, FILM COATED ORAL NIGHTLY
Qty: 30 TABLET | Refills: 0 | Status: SHIPPED | OUTPATIENT
Start: 2021-11-15

## 2021-11-15 RX ORDER — AMLODIPINE BESYLATE 5 MG/1
5 TABLET ORAL DAILY
Status: DISCONTINUED | OUTPATIENT
Start: 2021-11-15 | End: 2021-11-15

## 2021-11-15 RX ORDER — AMLODIPINE BESYLATE 5 MG/1
5 TABLET ORAL DAILY
Qty: 30 TABLET | Refills: 0 | Status: SHIPPED
Start: 2021-11-15 | End: 2021-11-15 | Stop reason: HOSPADM

## 2021-11-15 RX ORDER — HYDROCODONE BITARTRATE AND ACETAMINOPHEN 5; 325 MG/1; MG/1
1 TABLET ORAL EVERY 8 HOURS PRN
Qty: 9 TABLET | Refills: 0 | Status: SHIPPED | OUTPATIENT
Start: 2021-11-15 | End: 2021-11-18

## 2021-11-15 RX ORDER — FLUTICASONE PROPIONATE 50 MCG
2 SPRAY, SUSPENSION (ML) NASAL DAILY
Status: DISCONTINUED | OUTPATIENT
Start: 2021-11-15 | End: 2021-11-15 | Stop reason: HOSPADM

## 2021-11-15 RX ORDER — ASPIRIN 81 MG/1
81 TABLET ORAL DAILY
Qty: 30 TABLET | Refills: 0 | Status: SHIPPED | OUTPATIENT
Start: 2021-11-16

## 2021-11-15 RX ORDER — FLUTICASONE PROPIONATE 50 MCG
2 SPRAY, SUSPENSION (ML) NASAL DAILY
Qty: 16 G | Refills: 0 | Status: SHIPPED | OUTPATIENT
Start: 2021-11-15

## 2021-11-15 RX ORDER — LISINOPRIL 10 MG/1
10 TABLET ORAL DAILY
Status: DISCONTINUED | OUTPATIENT
Start: 2021-11-15 | End: 2021-11-15

## 2021-11-15 RX ADMIN — INSULIN LISPRO 1 UNITS: 100 INJECTION, SOLUTION INTRAVENOUS; SUBCUTANEOUS at 12:06

## 2021-11-15 RX ADMIN — ASPIRIN 81 MG: 81 TABLET, COATED ORAL at 08:41

## 2021-11-15 RX ADMIN — INSULIN LISPRO 1 UNITS: 100 INJECTION, SOLUTION INTRAVENOUS; SUBCUTANEOUS at 08:42

## 2021-11-15 RX ADMIN — Medication 10 ML: at 08:42

## 2021-11-15 RX ADMIN — MORPHINE SULFATE 2 MG: 2 INJECTION, SOLUTION INTRAMUSCULAR; INTRAVENOUS at 02:27

## 2021-11-15 RX ADMIN — ENOXAPARIN SODIUM 40 MG: 100 INJECTION SUBCUTANEOUS at 08:41

## 2021-11-15 ASSESSMENT — PAIN DESCRIPTION - PAIN TYPE
TYPE: ACUTE PAIN

## 2021-11-15 ASSESSMENT — PAIN DESCRIPTION - DESCRIPTORS
DESCRIPTORS: ACHING

## 2021-11-15 ASSESSMENT — PAIN DESCRIPTION - ORIENTATION
ORIENTATION: LEFT

## 2021-11-15 ASSESSMENT — PAIN SCALES - GENERAL
PAINLEVEL_OUTOF10: 5
PAINLEVEL_OUTOF10: 0
PAINLEVEL_OUTOF10: 4
PAINLEVEL_OUTOF10: 0
PAINLEVEL_OUTOF10: 6

## 2021-11-15 ASSESSMENT — PAIN DESCRIPTION - ONSET
ONSET: ON-GOING
ONSET: AWAKENED FROM SLEEP

## 2021-11-15 ASSESSMENT — PAIN DESCRIPTION - LOCATION
LOCATION: HEAD

## 2021-11-15 ASSESSMENT — PAIN DESCRIPTION - FREQUENCY
FREQUENCY: CONTINUOUS
FREQUENCY: CONTINUOUS

## 2021-11-15 NOTE — DISCHARGE SUMMARY
Hospital Medicine Discharge Summary    Patient ID: Kim Herman      Patient's PCP: Blanca Olmedo MD    Admit Date: 11/12/2021     Discharge Date: 11/15/2021      Admitting Physician: Kinsey Elias MD     Discharge Physician: ZULEYKA Odell - CNP     Discharge Diagnoses  Acute left-sided CVA  NIDDM  Hypertension  Hyperlipidemia  Headache      Hospital Course: A 78-year-old  male who was at  home states that all of a sudden he just felt like he could not \"speak  properly\" and he was having difficulty with using his electronic tablet  and he was just unable to do things that normally he can do without  difficulty.  He told this to his wife and she decided to bring him to  the hospital.  En route to the hospital when his symptoms started  improving, he started having a headache which was actually more severe  traveling from back to the front and got more intense as his  neurological symptoms were improving.  At the time of my exam, the  patient states that his symptoms have completely gone away and he is not  having any headache or any other clinical symptoms whatsoever. Acute left-sided CVA  -Continue aspirin 81 mg daily  -Change statin to high intensity atorvastatin 40 mg daily  -MRI confirmed acute and subacute stroke to left occipital and parietal lobes  -CTA head and neck did not demonstrate any high-grade atherosclerotic disease  -Echo nonacute  -No arrhythmias noted on telemetry. Cardiac monitor placed at discharge.   If abnormal patient will follow up with cardiologist, otherwise follow-up with PCP for results  -Neurology consulted  -Lipid panel total cholesterol 103, HDL 43, LDL 48, triglycerides 60  -Hemoglobin A1c 6.4  -Patient notes A1c had been elevated but once he was started on Jardiance it improved to less than 7  -Continue blood pressure control  -PT OT ST-scored well, does not require home assistance     NIDDM  -Resume home medication     Hypertension  -Systolic blood pressures 150s at discharge.     Hyperlipidemia  -Lipid panel within normal limits, although given new stroke recommend statin therapy  -Continue atorvastatin    Headache  -Occurred last night, likely from elevated blood pressure of systolics up to 952K. Patient states he was anxious awaiting the echo and the results of the echo. Blood pressure improved on its own down to the 150s. -Encourage patient to monitor blood pressure at home  -Norco No. 9 tablets prescription given for intermittent headache. Headache may be a result from stroke as well. Patient stated they felt well and wanted to go home. He complained of some left-sided sinus pressure, describes headache on the left side as well. Added Flonase for sinus congestion. If sinus pain pressure worsens, patient will discuss with PCP for treatment of possible sinusitis. Patient verbalized understanding of this plan and agreed to it. Patient denied chest pain, shortness of breath, palpitations, abdominal pain, nausea vomiting, diarrhea, dysuria, headache lightheadedness or dizziness. Appetite good. Voiding without difficulty. Reviewed plan of care with patient, verbalized understanding and agreement. Denied further questions or needs. Physical Exam Performed:     BP (!) 155/82   Pulse 78   Temp 98.1 °F (36.7 °C) (Oral)   Resp 16   Ht 6' 4\" (1.93 m)   Wt 242 lb (109.8 kg)   SpO2 98%   BMI 29.46 kg/m²       General appearance:  No apparent distress, appears stated age and cooperative. HEENT:  Normal cephalic, atraumatic without obvious deformity. Pupils equal, round, and reactive to light. Extra ocular muscles intact. Conjunctivae/corneas clear. Neck: Supple, with full range of motion. No jugular venous distention. Trachea midline. Respiratory:  Normal respiratory effort. Clear to auscultation, bilaterally without Rales/Wheezes/Rhonchi.   Cardiovascular:  Regular rate and rhythm with normal S1/S2 without murmurs, rubs or gallops. Abdomen: Soft, non-tender, non-distended with normal bowel sounds. Musculoskeletal:  No clubbing, cyanosis or edema bilaterally. Full range of motion without deformity. Skin: Skin color, texture, turgor normal.  No rashes or lesions. Neurologic:  Neurovascularly intact without any focal sensory/motor deficits. Cranial nerves: II-XII intact, grossly non-focal.  Psychiatric:  Alert and oriented, thought content appropriate, normal insight  Capillary Refill: Brisk,< 3 seconds   Peripheral Pulses: +2 palpable, equal bilaterally       Labs: For convenience and continuity at follow-up the following most recent labs are provided:      CBC:    Lab Results   Component Value Date    WBC 7.5 11/15/2021    HGB 14.1 11/15/2021    HCT 42.9 11/15/2021     11/15/2021       Renal:    Lab Results   Component Value Date     11/15/2021    K 4.0 11/15/2021     11/15/2021    CO2 24 11/15/2021    BUN 18 11/15/2021    CREATININE 0.9 11/15/2021    CALCIUM 9.1 11/15/2021         Significant Diagnostic Studies    Radiology:   CT HEAD WO CONTRAST   Final Result   No acute intracranial abnormality. MRI may be obtained if clinically   indicated. CTA HEAD NECK W CONTRAST   Final Result   1. No CT evidence of acute intracranial abnormality. 2. The small infarcts seen on the prior MRI are not well visualized on this   exam.   3. 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 4. Otherwise, no significant stenosis of the cervical carotid/vertebral   arteries or [id number]. CT HEAD WO CONTRAST   Final Result   1. No CT evidence of acute intracranial abnormality. 2. The small infarcts seen on the prior MRI are not well visualized on this   exam.   3. 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    4. Otherwise, no significant stenosis of the cervical carotid/vertebral   arteries or [id number]-[id number]. MRI brain without contrast   Final Result   1. Tiny areas of acute to subacute infarct involving the left parietal and   left occipital lobes. No significant mass effect or midline shift. 2. Otherwise, no acute intracranial abnormality. 3. Mild global parenchymal volume loss with mild chronic microvascular   ischemic changes. These results were sent to the [facility] [address] ([address] [place] [address]) on   11/13/2021 at 7:25 am to be communicated to the referring/covering health   care provider/office. Consults:     IP CONSULT TO NEUROLOGY    Disposition: Home    Condition at Discharge: Stable    Discharge Instructions/Follow-up:      Follow up with pcp in 1 week  Follow up with PCP regarding heart monitor results, if there is an abnormality found cardiology will call you  Check bp twice a day at home, record and review with PCP    Code Status:  Prior     Activity: activity as tolerated    Diet: cardiac diet      Discharge Medications:     Discharge Medication List as of 11/15/2021  2:18 PM           Details   HYDROcodone-acetaminophen (NORCO) 5-325 MG per tablet Take 1 tablet by mouth every 8 hours as needed for Pain for up to 3 days. Intended supply: 3 days.  Take lowest dose possible to manage pain, Disp-9 tablet, R-0Print      aspirin 81 MG EC tablet Take 1 tablet by mouth daily, Disp-30 tablet, R-0Normal      atorvastatin (LIPITOR) 40 MG tablet Take 1 tablet by mouth nightly, Disp-30 tablet, R-0Normal      fluticasone (FLONASE) 50 MCG/ACT nasal spray 2 sprays by Each Nostril route daily, Disp-16 g, R-0Normal              Details   empagliflozin (JARDIANCE) 10 MG tablet Take 10 mg by mouth dailyHistorical Med      glimepiride (AMARYL) 4 MG tablet Take 4 mg by mouth every morning (before breakfast)      metFORMIN (GLUCOPHAGE) 1000 MG tablet Take 1,000 mg by mouth 2 times daily (with meals)      triamcinolone (KENALOG) 0.1 % cream Apply topically 2 times daily Apply topically 2 times daily. , Topical, 2 TIMES DAILY Starting 12/6/2016, Until Discontinued, Disp-1 Tube, R-3, Normal             Time Spent on discharge is more than 30 minutes in the examination, evaluation, counseling and review of medications and discharge plan. Signed: Sowmya Brian, ZULEYKA - CNP   11/16/2021    The patient was seen and examined on day of discharge and this discharge summary is in conjunction with any daily progress note from day of discharge. Thank you Rhett Tesfaye MD for the opportunity to be involved in this patient's care. If you have any questions or concerns please feel free to contact me at [phone]. NOTE:  This report was transcribed using voice recognition software. Every effort was made to ensure accuracy; however, inadvertent computerized transcription errors may be present.

## 2021-11-15 NOTE — PROGRESS NOTES
Robin Harry  Neurology Follow-up  [facility] Neurology    Date of Service: 11/15/2021    Subjective:   CC: Follow up today regarding: acute visual disturbance and new stroke    Events noted. Chart and lab reviewed. The patient denies any new symptoms today. Awaiting echo result. No headache, chest pain, dysphagia or dysarthria. No visual changes today. Other review of system was unremarkable. ROS : A 10-12 system review obtained and updated today and is unremarkable except as mentioned  in my interval history. family history includes Diabetes in his mother; Stroke in his father and mother.     Past Medical History:   Diagnosis Date    Cancer [facility])     prostate cancer    Hemorrhoids, external     Hepatitis     Hernia of unspecified site of abdominal cavity without mention of obstruction or gangrene     Kidney disease     Measles     Type II or unspecified type diabetes mellitus without mention of complication, not stated as uncontrolled      Current Facility-Administered Medications   Medication Dose Route Frequency Provider Last Rate Last Admin    morphine (PF) injection 2 mg  2 mg IntraVENous Q2H PRN Balbina Phillip MD   2 mg at 11/15/21 0227    fluticasone (FLONASE) 50 MCG/ACT nasal spray 2 spray  2 spray Each Nostril Daily ZULEYKA Chacon CNP        atorvastatin (LIPITOR) tablet 40 mg  40 mg Oral Nightly ZULEYKA Chacon CNP   40 mg at 11/14/21 2141    glucose (GLUTOSE) 40 % oral gel 15 g  15 g Oral PRN ZULEYKA Doe CNP        dextrose 50 % IV solution  12.5 g IntraVENous PRN ZULEYKA Chacon CNP        glucagon (rDNA) injection 1 mg  1 mg IntraMUSCular PRN ZULEYKA Chacon CNP        dextrose 5 % solution  100 mL/hr IntraVENous PRN ZULEYKA Chacon CNP        insulin lispro (1 Unit Dial) 0-6 Units  0-6 Units SubCUTAneous TID  ZULEYKA Doe CNP   1 Units at 11/15/21 1206    insulin lispro (1 Unit Dial) 0-3 Units  0-3 Units SubCUTAneous Nightly Audery Jose, APRN - CNP   1 Units at 11/14/21 2139    sodium chloride flush 0.9 % injection 5-40 mL  5-40 mL IntraVENous BID Audery Jose, APRN - CNP   10 mL at 11/15/21 0842    sodium chloride flush 0.9 % injection 5-40 mL  5-40 mL IntraVENous PRN Audery Jose, APRN - CNP   10 mL at 11/13/21 0843    ondansetron (ZOFRAN-ODT) disintegrating tablet 4 mg  4 mg Oral Q8H PRN Berto Mabry MD        Or    ondansetron (ZOFRAN) injection 4 mg  4 mg IntraVENous Q6H PRN Berto Mabry MD        polyethylene glycol (GLYCOLAX) packet 17 g  17 g Oral Daily PRN Berto Mabry MD        enoxaparin (LOVENOX) injection 40 mg  40 mg SubCUTAneous Daily Berto Mabry MD   40 mg at 11/15/21 0841    aspirin EC tablet 81 mg  81 mg Oral Daily Berto Mabry MD   81 mg at 11/15/21 0841    Or    aspirin suppository 300 mg  300 mg Rectal Daily Berto Mabry MD         No Known Allergies   reports that he has quit smoking. His smoking use included cigars. He has never used smokeless tobacco. He reports that he does not drink alcohol and does not use drugs. Objective:  Exam:   Constitutional:   Vitals:    11/15/21 0303 11/15/21 0327 11/15/21 0742 11/15/21 1201   BP: (!) 173/86 (!) 175/82 (!) 183/76 (!) 155/82   Pulse: 52 55 75 78   Resp: 14 16 16 16   Temp: 97.9 °F (36.6 °C) 97.6 °F (36.4 °C) 97.7 °F (36.5 °C) 98.1 °F (36.7 °C)   TempSrc: Oral Oral Oral Oral   SpO2: 96% 96% 96% 98%   Weight:       Height:         General appearance:  Normal development and appear in no acute distress. Mental Status:   Oriented to person, place, problem, and time. Memory: Good immediate recall. Intact remote memory  Normal attention span and concentration. Language: intact naming, repeating and fluency   Good fund of Knowledge. Cranial Nerves:   II: Visual fields: Full.    Pupils: equal, round, reactive to light  III,IV,VI: Extra Ocular Movements are intact. No nystagmus  V: Facial sensation is intact  VII: Facial strength and movements: intact and symmetric  IX: Palate elevation is symmetric  XI: Shoulder shrug is intact  XII: Tongue movements are normal  Musculoskeletal: 5/5 in all 4 extremities. Tone: Normal tone. Reflexes: Symmetric 2+ in both arms and legs. Coordination: no pronator drift, no dysmetria with FNF  Sensation: normal to all modalities in both arms and legs. Gait/Posture: steady gait        Data:  LABS:   Lab Results   Component Value Date     11/15/2021    K 4.0 11/15/2021     11/15/2021    CO2 24 11/15/2021    BUN 18 11/15/2021    CREATININE 0.9 11/15/2021    GFRAA >60 11/15/2021    LABGLOM >60 11/15/2021    GLUCOSE 151 11/15/2021    CALCIUM 9.1 11/15/2021     Lab Results   Component Value Date    WBC 7.5 11/15/2021    RBC 5.31 11/15/2021    HGB 14.1 11/15/2021    HCT 42.9 11/15/2021    MCV 80.8 11/15/2021    RDW 15.2 11/15/2021     11/15/2021     Lab Results   Component Value Date    INR 1.01 11/12/2021    PROTIME 11.4 11/12/2021       Neuroimaging was independently reviewed by me and discussed results with the patient  I reviewed blood testing and other test results and discussed results with the patient      Impression:    Acute left hemispheric CVA, thromboembolic versus cardioembolic  Hypertension, not controlled  Diabetes  Hyperlipidemia         Recommendation  Awaiting echo results  Aspirin  Statin  Blood sugar control  Telemetry  Continue blood pressure medications  Event monitor with PCP for 4 weeks  Stroke education was discussed in detail with the patient and his wife over the phone  Discussed driving precautions and the need to have ophthalmology examination for formal visual field testing  PT and OT  Can be discharged neurology once medically stable    MDM: High due to risk for stroke recurrence and multiple comorbid medical illness.            Jessica Miller MD   [phone]      This dictation was generated by voice recognition computer software. Although all attempts are made to edit the dictation for accuracy, there may be errors in the transcription that are not intended.

## 2021-11-15 NOTE — FLOWSHEET NOTE
Data- discharge order received, pt verbalized agreement to discharge, disposition to previous residence, no needs for HHC/DME. Action- discharge instructions prepared and given to patient, pt verbalized understanding. Medication information packet given r/t NEW and/or CHANGED prescriptions emphasizing name/purpose/side effects, pt verbalized understanding. Discharge instruction summary: Diet- carb control, Activity- as tolerated, Primary Care Physician as follows: Marysol Pedraza [phone] f/u appointment 11/17, immunizations reviewed and up to date, prescription medications filled preferred pharmacy. 1. WEIGHT: Admit Weight: 242 lb (109.8 kg) (11/13/21 0118)        Today  Weight: 242 lb (109.8 kg) (11/13/21 0118)       2. O2 SAT.: SpO2: 98 % (11/15/21 1201)    Response- Pt belongings gathered, IV removed. Disposition is home (no HHC/DME needs), transported with belongings, taken to lobby via w/c w/d/c lounge, no complications.

## 2021-11-15 NOTE — PROGRESS NOTES
Hospitalist notified that pt has reported sudden onset of headache 6/10 aching pain behind left eye with no changes to neuro assessment or NIHSS. See new orders for morphine and STAT CT.

## 2021-11-16 ENCOUNTER — CARE COORDINATION (OUTPATIENT)
Dept: CASE MANAGEMENT | Age: 76
End: 2021-11-16

## 2021-11-16 DIAGNOSIS — I10 HTN (HYPERTENSION), BENIGN: Primary | ICD-10-CM

## 2021-11-16 PROCEDURE — 1111F DSCHRG MED/CURRENT MED MERGE: CPT | Performed by: FAMILY MEDICINE

## 2021-11-16 NOTE — CARE COORDINATION
[facility] Transitions Initial Follow Up Call    Call within 2 business days of discharge: Yes    Patient: Marco Overall Patient : 1945   MRN: [id number]  Reason for Admission: Altered mental status  Discharge Date: 11/15/21 RARS: Readmission Risk Score: 8 ( )      Last Discharge [facility]       Complaint Diagnosis Description Type Department Provider    21  HTN (hypertension), benign . .. Admission (Discharged) [facility] 3T Mavis Pina MD    21 Altered Mental Status Cerebrovascular accident (CVA), unspecified mechanism [facility]) ED (TRANSFER) [address] [facility] ED Neva Sinha MD           Spoke with: [address] [facility] [address]: [facility]    Non-face-to-face services provided:  Obtained and reviewed discharge summary and/or continuity of care documents   Transitions of Care Initial Call    Was this an external facility discharge? No Discharge Facility: [facility]    Challenges to be reviewed by the provider   Additional needs identified to be addressed with provider: Yes  none             Method of communication with provider : chart routing      Advance Care Planning:   Does patient have an Advance Directive: Not on file  Was this a readmission? No  Patient stated reason for admission: Altered mental status  Patients top risk factors for readmission: ineffective coping    Care Transition Nurse (CTN) contacted the patient by telephone to perform post hospital discharge assessment. Verified name and  with patient as identifiers. Provided introduction to self, and explanation of the CTN role. CTN reviewed discharge instructions, medical action plan and red flags with patient who verbalized understanding. Patient given an opportunity to ask questions and does not have any further questions or concerns at this time. Were discharge instructions available to patient? Yes.  Reviewed appropriate site of care based on symptoms and resources available to patient including: PCP, Specialist, Urgent care clinics, When to call 911 and [address]. The patient agrees to contact the PCP office for questions related to their healthcare. Medication reconciliation was performed with patient, who verbalizes understanding of administration of home medications. Advised obtaining a 90-day supply of all daily and as-needed medications. Covid Risk Education     Educated patient about risk for severe COVID-19 due to risk factors according to CDC guidelines. LPN CC reviewed discharge instructions, medical action plan and red flag symptoms with the patient who verbalized understanding. Discussed COVID vaccination status: Yes. Education provided on COVID-19 vaccination as appropriate. Discussed exposure protocols and quarantine with CDC Guidelines. Patient was given an opportunity to verbalize any questions and concerns and agrees to contact LPN CC or health care provider for questions related to their healthcare. Reviewed and educated patient on any new and changed medications related to discharge diagnosis. Was patient discharged with a pulse oximeter? No Discussed and confirmed pulse oximeter discharge instructions and when to notify provider or seek emergency care. LPN CC provided contact information. Plan for follow-up call in 5-7 days based on severity of symptoms and risk factors. Plan for next call: F/U appt? Dizziness? This [facility] spoke with pt and pt stated that he is doing well. Patient denied any worsening symptoms. Denied fever, chills, N/V and any difficulty breathing at this time. Denied difficulty with urination, BMs or appetite. Denied chest pain and SOB. Pt reported that his BP was 144/79. Denied any dizziness, HA, altered mental/confusion. Medication review performed and patient verbalized understanding and is taking all medications as prescribed. Pt wanted to know if he is to continue taking amlodipine.  Writer instructed pt to take the meds that we reviewed on the AVS only and to take them as prescribed and pt agreed. Pt stated that he has a f/u with his PCP on tomorrow however this is not indicated in Epic. Writer will route a message to the PCP to facilitate. Advised pt to immediately report any worsening symptoms to the PCP. Patient verbalized understanding and agreed. Temi Mullins LPN, [facility]  PH: [phone]              Care Transitions 24 Hour Call    Schedule Follow Up Appointment with PCP: Completed  Do you have any ongoing symptoms?: No  Do you have a copy of your discharge instructions?: Yes  Do you have all of your prescriptions and are they filled?: Yes  Have you been contacted by a [facility] Pharmacist?: No  Have you scheduled your follow up appointment?: Yes  How are you going to get to your appointment?: Car - family or friend to transport  Were you discharged with any Home Care or Post Acute Services: No  Care Transitions Interventions  No Identified Needs         Follow Up  No future appointments.     Temi Mullins LPN

## 2021-11-23 ENCOUNTER — CARE COORDINATION (OUTPATIENT)
Dept: CASE MANAGEMENT | Age: 76
End: 2021-11-23

## 2021-12-17 PROCEDURE — 93272 ECG/REVIEW INTERPRET ONLY: CPT | Performed by: INTERNAL MEDICINE

## 2022-03-23 ENCOUNTER — OFFICE VISIT (OUTPATIENT)
Dept: ENT CLINIC | Age: 77
End: 2022-03-23
Payer: COMMERCIAL

## 2022-03-23 VITALS
SYSTOLIC BLOOD PRESSURE: 110 MMHG | BODY MASS INDEX: 27.23 KG/M2 | DIASTOLIC BLOOD PRESSURE: 61 MMHG | TEMPERATURE: 97.3 F | HEIGHT: 76 IN | WEIGHT: 223.6 LBS | HEART RATE: 76 BPM

## 2022-03-23 DIAGNOSIS — J38.7 PRESBYLARYNGES: ICD-10-CM

## 2022-03-23 DIAGNOSIS — R49.0 DYSPHONIA: Primary | ICD-10-CM

## 2022-03-23 PROCEDURE — 99203 OFFICE O/P NEW LOW 30 MIN: CPT | Performed by: OTOLARYNGOLOGY

## 2022-03-23 PROCEDURE — 31575 DIAGNOSTIC LARYNGOSCOPY: CPT | Performed by: OTOLARYNGOLOGY

## 2022-03-23 ASSESSMENT — ENCOUNTER SYMPTOMS
EYE ITCHING: 0
COUGH: 0
VOICE CHANGE: 1
APNEA: 0
SINUS PRESSURE: 0
TROUBLE SWALLOWING: 0
FACIAL SWELLING: 0
SORE THROAT: 0
SHORTNESS OF BREATH: 0

## 2022-03-23 NOTE — PROGRESS NOTES
Wilian Flores [place] [address], [address]  [place], [place] Veronica Juarez  P: [phone]       Patient     Edrihugo Marquez  1945    ChiefComplaint     Chief Complaint   Patient presents with   Clive Kilgore     If he talks for a long period of time his voice seems to go out, states this started about a week ago. History of Present Illness     Jacky Britton 59-year-old male here today for evaluation of dysphonia. For the last week has been having frequent episodes of his voice going out. It will return with rest or with clearing of his throat. Denies postnasal drainage, dysphagia, odynophagia, hemoptysis. History of smoking cigar stopped 3 years ago. No history of daily alcohol use. Denies acid reflux but does consume a significant amount of spicy food. Reports right neck pain just off of midline. States it feels like a muscle cramp.     Past Medical History     Past Medical History:   Diagnosis Date    Cancer [facility])     prostate cancer    Hemorrhoids, external     Hepatitis     Hernia of unspecified site of abdominal cavity without mention of obstruction or gangrene     Kidney disease     Measles     Type II or unspecified type diabetes mellitus without mention of complication, not stated as uncontrolled        Past Surgical History     Past Surgical History:   Procedure Laterality Date    CYSTOSCOPY Right 03/19/2018     cysto, right ureteroscopy, holmium laser and stone removal, stent placement 6 x 24 JJ    NASAL POLYP SURGERY         Family History     Family History   Problem Relation Age of Onset    Diabetes Mother     Stroke Mother     Stroke Father        Social History     Social History     Tobacco Use    Smoking status: Former Smoker     Types: Cigars     Quit date: 2019     Years since quitting: 3.2    Smokeless tobacco: Never Used   Vaping Use    Vaping Use: Never used   Substance Use Topics    Alcohol use: No    Drug use: No        Allergies     No Known Allergies    Medications     Current Outpatient Medications   Medication Sig Dispense Refill    aspirin 81 MG EC tablet Take 1 tablet by mouth daily 30 tablet 0    atorvastatin (LIPITOR) 40 MG tablet Take 1 tablet by mouth nightly 30 tablet 0    fluticasone (FLONASE) 50 MCG/ACT nasal spray 2 sprays by Each Nostril route daily 16 g 0    empagliflozin (JARDIANCE) 10 MG tablet Take 10 mg by mouth daily      glimepiride (AMARYL) 4 MG tablet Take 4 mg by mouth every morning (before breakfast)      metFORMIN (GLUCOPHAGE) 1000 MG tablet Take 1,000 mg by mouth 2 times daily (with meals)      triamcinolone (KENALOG) 0.1 % cream Apply topically 2 times daily Apply topically 2 times daily. 1 Tube 3     No current facility-administered medications for this visit. Review of Systems     Review of Systems   Constitutional: Negative for appetite change, chills, fatigue, fever and unexpected weight change. HENT: Positive for voice change. Negative for congestion, ear discharge, ear pain, facial swelling, hearing loss, nosebleeds, postnasal drip, sinus pressure, sneezing, sore throat, tinnitus and trouble swallowing. Eyes: Negative for itching. Respiratory: Negative for apnea, cough and shortness of breath. Endocrine: Negative for cold intolerance and heat intolerance. Musculoskeletal: Negative for myalgias and neck pain. Skin: Negative for rash. Allergic/Immunologic: Negative for environmental allergies. Neurological: Negative for dizziness and headaches. Psychiatric/Behavioral: Negative for confusion, decreased concentration and sleep disturbance. PhysicalExam     Vitals:    03/23/22 1148   BP: 110/61   Site: Left Upper Arm   Position: Sitting   Cuff Size: Medium Adult   Pulse: 76   Temp: 97.3 °F (36.3 °C)   TempSrc: Infrared   Weight: 223 lb 9.6 oz (101.4 kg)   Height: 6' 4.25\" (1.937 m)       Physical Exam  Constitutional:       General: He is not in acute distress.      Appearance: He is well-developed. Comments: Frequent vocal breaks   HENT:      Head: Normocephalic and atraumatic. Right Ear: Tympanic membrane, ear canal and external ear normal. No drainage. No middle ear effusion. Tympanic membrane is not bulging. Tympanic membrane has normal mobility. Left Ear: Tympanic membrane, ear canal and external ear normal. No drainage. No middle ear effusion. Tympanic membrane is not bulging. Tympanic membrane has normal mobility. Nose: No mucosal edema or rhinorrhea. Mouth/Throat:      Lips: Pink. Mouth: Mucous membranes are moist.      Tongue: No lesions. Palate: No mass. Pharynx: Uvula midline. Eyes:      Pupils: Pupils are equal, round, and reactive to light. Neck:      Thyroid: No thyroid mass or thyromegaly. Trachea: Trachea and phonation normal.   Cardiovascular:      Pulses: Normal pulses. Pulmonary:      Effort: Pulmonary effort is normal. No accessory muscle usage or respiratory distress. Breath sounds: No stridor. Musculoskeletal:      Cervical back: Full passive range of motion without pain. Lymphadenopathy:      Head:      Right side of head: No submental or submandibular adenopathy. Left side of head: No submental or submandibular adenopathy. Cervical: No cervical adenopathy. Right cervical: No superficial, deep or posterior cervical adenopathy. Left cervical: No superficial, deep or posterior cervical adenopathy. Skin:     General: Skin is warm and dry. Neurological:      Mental Status: He is alert and oriented to person, place, and time. Cranial Nerves: No cranial nerve deficit. Coordination: Coordination normal.      Gait: Gait normal.   Psychiatric:         Thought Content:  Thought content normal.           Procedure     Flexible Laryngoscopy    Pre op: dysphonia   Post op: same, presbylargnes  Procedure : Flexible Laryngoscopy  Surgeon: Axel Colby DO  Anesthesia: Afrin with 4% lidocaine  Indication: Laryngeal mirror examination was not tolerated due to gag reflex  Description:  The scope was passed along the floor of the right naris to the level of the larynx. The base of tongue, vallecula, epiglottis, aryepiglottic folds, arytenoids, false vocal folds, true vocal folds, or pyriform sinuses were all evaluated. True vocal folds exhibited symmetric motion bilaterally without evidence of paralysis or paresis. The scope was removed. The patient tolerated the procedure without difficulty. There were no complications. Pertinent findings: Bowing of left true vocal cord with phonation, frothy secretions in bilateral piriform and vallecula        Assessment and Plan     1. Dysphonia  - Linda Ambrosio, SLP - Speech Therapy  [facility]    2. Presbylarynges    Discussed findings with patient. Plan for evaluation by Lauralee Schaumann. Discussed lifestyle modifications to minimize acid reflux. Gentle component of muscle tension given patient soreness    Steve Blackwell, DO  3/23/22      Portions of this note were dictated using Dragon.  There may be linguistic errors secondary to the use of this program.

## 2023-01-21 ENCOUNTER — APPOINTMENT (OUTPATIENT)
Dept: CT IMAGING | Age: 78
End: 2023-01-21
Payer: COMMERCIAL

## 2023-01-21 ENCOUNTER — HOSPITAL ENCOUNTER (EMERGENCY)
Age: 78
Discharge: HOME | End: 2023-01-21
Payer: COMMERCIAL

## 2023-01-21 VITALS
DIASTOLIC BLOOD PRESSURE: 68 MMHG | RESPIRATION RATE: 16 BRPM | BODY MASS INDEX: 27.28 KG/M2 | HEART RATE: 70 BPM | WEIGHT: 224 LBS | HEIGHT: 76 IN | TEMPERATURE: 97.5 F | OXYGEN SATURATION: 99 % | SYSTOLIC BLOOD PRESSURE: 145 MMHG

## 2023-01-21 DIAGNOSIS — N20.0 KIDNEY STONE: Primary | ICD-10-CM

## 2023-01-21 DIAGNOSIS — R10.9 FLANK PAIN: ICD-10-CM

## 2023-01-21 LAB
A/G RATIO: 1.8 (ref 1.1–2.2)
ALBUMIN SERPL-MCNC: 4.4 G/DL (ref 3.4–5)
ALP BLD-CCNC: 75 U/L (ref 40–129)
ALT SERPL-CCNC: 18 U/L (ref 10–40)
ANION GAP SERPL CALCULATED.3IONS-SCNC: 13 MMOL/L (ref 3–16)
AST SERPL-CCNC: 14 U/L (ref 15–37)
BACTERIA: ABNORMAL /HPF
BASOPHILS ABSOLUTE: 0.1 K/UL (ref 0–0.2)
BASOPHILS RELATIVE PERCENT: 0.6 %
BILIRUB SERPL-MCNC: 1 MG/DL (ref 0–1)
BILIRUBIN URINE: NEGATIVE
BLOOD, URINE: ABNORMAL
BUN BLDV-MCNC: 22 MG/DL (ref 7–20)
CALCIUM SERPL-MCNC: 9.3 MG/DL (ref 8.3–10.6)
CHLORIDE BLD-SCNC: 103 MMOL/L (ref 99–110)
CLARITY: CLEAR
CO2: 23 MMOL/L (ref 21–32)
COLOR: YELLOW
CREAT SERPL-MCNC: 1.1 MG/DL (ref 0.8–1.3)
EOSINOPHILS ABSOLUTE: 0.2 K/UL (ref 0–0.6)
EOSINOPHILS RELATIVE PERCENT: 2.1 %
GFR SERPL CREATININE-BSD FRML MDRD: >60 ML/MIN/{1.73_M2}
GLUCOSE BLD-MCNC: 209 MG/DL (ref 70–99)
GLUCOSE URINE: >=1000 MG/DL
HCT VFR BLD CALC: 45.1 % (ref 40.5–52.5)
HEMOGLOBIN: 14.5 G/DL (ref 13.5–17.5)
KETONES, URINE: NEGATIVE MG/DL
LEUKOCYTE ESTERASE, URINE: NEGATIVE
LYMPHOCYTES ABSOLUTE: 1 K/UL (ref 1–5.1)
LYMPHOCYTES RELATIVE PERCENT: 8.9 %
MCH RBC QN AUTO: 27 PG (ref 26–34)
MCHC RBC AUTO-ENTMCNC: 32.2 G/DL (ref 31–36)
MCV RBC AUTO: 83.8 FL (ref 80–100)
MICROSCOPIC EXAMINATION: YES
MONOCYTES ABSOLUTE: 0.7 K/UL (ref 0–1.3)
MONOCYTES RELATIVE PERCENT: 6.3 %
NEUTROPHILS ABSOLUTE: 9.3 K/UL (ref 1.7–7.7)
NEUTROPHILS RELATIVE PERCENT: 82.1 %
NITRITE, URINE: NEGATIVE
PDW BLD-RTO: 14.5 % (ref 12.4–15.4)
PH UA: 5.5 (ref 5–8)
PLATELET # BLD: 181 K/UL (ref 135–450)
PMV BLD AUTO: 7.6 FL (ref 5–10.5)
POTASSIUM REFLEX MAGNESIUM: 4 MMOL/L (ref 3.5–5.1)
PROTEIN UA: NEGATIVE MG/DL
RBC # BLD: 5.38 M/UL (ref 4.2–5.9)
RBC UA: ABNORMAL /HPF (ref 0–4)
SODIUM BLD-SCNC: 139 MMOL/L (ref 136–145)
SPECIFIC GRAVITY UA: 1.01 (ref 1–1.03)
TOTAL PROTEIN: 6.8 G/DL (ref 6.4–8.2)
URINE REFLEX TO CULTURE: ABNORMAL
URINE TYPE: ABNORMAL
UROBILINOGEN, URINE: 0.2 E.U./DL
WBC # BLD: 11.3 K/UL (ref 4–11)
WBC UA: ABNORMAL /HPF (ref 0–5)

## 2023-01-21 PROCEDURE — 74176 CT ABD & PELVIS W/O CONTRAST: CPT

## 2023-01-21 PROCEDURE — 85025 COMPLETE CBC W/AUTO DIFF WBC: CPT

## 2023-01-21 PROCEDURE — 81001 URINALYSIS AUTO W/SCOPE: CPT

## 2023-01-21 PROCEDURE — 6360000002 HC RX W HCPCS: Performed by: PHYSICIAN ASSISTANT

## 2023-01-21 PROCEDURE — 80053 COMPREHEN METABOLIC PANEL: CPT

## 2023-01-21 PROCEDURE — 99284 EMERGENCY DEPT VISIT MOD MDM: CPT

## 2023-01-21 PROCEDURE — 96374 THER/PROPH/DIAG INJ IV PUSH: CPT

## 2023-01-21 RX ORDER — KETOROLAC TROMETHAMINE 30 MG/ML
15 INJECTION, SOLUTION INTRAMUSCULAR; INTRAVENOUS ONCE
Status: COMPLETED | OUTPATIENT
Start: 2023-01-21 | End: 2023-01-21

## 2023-01-21 RX ORDER — ONDANSETRON 4 MG/1
4 TABLET, ORALLY DISINTEGRATING ORAL EVERY 8 HOURS PRN
Qty: 20 TABLET | Refills: 0 | Status: SHIPPED | OUTPATIENT
Start: 2023-01-21 | End: 2023-01-28

## 2023-01-21 RX ORDER — KETOROLAC TROMETHAMINE 10 MG/1
10 TABLET, FILM COATED ORAL EVERY 6 HOURS PRN
Qty: 20 TABLET | Refills: 0 | Status: SHIPPED | OUTPATIENT
Start: 2023-01-21 | End: 2023-01-26

## 2023-01-21 RX ORDER — TAMSULOSIN HYDROCHLORIDE 0.4 MG/1
0.4 CAPSULE ORAL DAILY
Qty: 5 CAPSULE | Refills: 0 | Status: SHIPPED | OUTPATIENT
Start: 2023-01-21 | End: 2023-01-26

## 2023-01-21 RX ADMIN — KETOROLAC TROMETHAMINE 15 MG: 30 INJECTION, SOLUTION INTRAMUSCULAR at 14:56

## 2023-01-21 ASSESSMENT — PAIN - FUNCTIONAL ASSESSMENT
PAIN_FUNCTIONAL_ASSESSMENT: NONE - DENIES PAIN
PAIN_FUNCTIONAL_ASSESSMENT: 0-10

## 2023-01-21 ASSESSMENT — PAIN SCALES - GENERAL
PAINLEVEL_OUTOF10: 0
PAINLEVEL_OUTOF10: 0
PAINLEVEL_OUTOF10: 7

## 2023-01-21 NOTE — CONSULTS
Perfect Served @ [id number]  RE: 7mm stone at right ureterovesical junction per Ester Denise MD called back @ [phone]

## 2023-01-21 NOTE — DISCHARGE INSTRUCTIONS
I have prescribed Toradol, Zofran and Flomax for symptom relief. Please follow-up with urology on Monday. If your symptoms worsen or you develop a fever or chills, or uncontrollable pain please return to the emergency department.

## 2023-01-21 NOTE — ED PROVIDER NOTES
[facility]  ED  EMERGENCY DEPARTMENT ENCOUNTER        Pt Name: Dara Napoles  MRN: [id number]  Armscatherinegfedgar 1945  Date of evaluation: 1/21/2023  Provider: KAMILLA Lee  PCP: Cheyenne Valente MD  Note Started: 4:37 PM EST 1/21/23      AMY. I have evaluated this patient. My supervising physician was available for consultation. CHIEF COMPLAINT       Chief Complaint   Patient presents with    Flank Pain     Pt to ED with c/o right side flank pain starting today. Pt with hx of kidney stones, states this feels similar. Saw urologist this week for bladder issues. Denies any blood in urine. Denies N/V       HISTORY OF PRESENT ILLNESS: 1 or more Elements     History from : Patient        Dara Napoles is a 68 y.o. male who presents complaining of right flank pain that began yesterday. The patient states he has a history of kidney stones requiring lithotripsy. He follows with urology. He denies any blood in urine. He denies any nausea or vomiting. He has not been taking anything for his pain. No recent fevers or chills. The pain radiates from right flank into right lower abdomen. Nursing Notes were all reviewed and agreed with or any disagreements were addressed in the HPI. REVIEW OF SYSTEMS :      Review of Systems    Positives and Pertinent negatives as per HPI.      SURGICAL HISTORY     Past Surgical History:   Procedure Laterality Date    CYSTOSCOPY Right 03/19/2018     cysto, right ureteroscopy, holmium laser and stone removal, stent placement 6 x 24 JJ    NASAL POLYP SURGERY         CURRENTMEDICATIONS       Previous Medications    ASPIRIN 81 MG EC TABLET    Take 1 tablet by mouth daily    ATORVASTATIN (LIPITOR) 40 MG TABLET    Take 1 tablet by mouth nightly    EMPAGLIFLOZIN (JARDIANCE) 10 MG TABLET    Take 10 mg by mouth daily    FLUTICASONE (FLONASE) 50 MCG/ACT NASAL SPRAY    2 sprays by Each Nostril route daily    GLIMEPIRIDE (AMARYL) 4 MG TABLET    Take 4 mg by mouth every morning (before breakfast)    METFORMIN (GLUCOPHAGE) 1000 MG TABLET    Take 1,000 mg by mouth 2 times daily (with meals)    TRIAMCINOLONE (KENALOG) 0.1 % CREAM    Apply topically 2 times daily Apply topically 2 times daily. ALLERGIES     Patient has no known allergies. FAMILYHISTORY       Family History   Problem Relation Age of Onset    Diabetes Mother     Stroke Mother     Stroke Father         SOCIAL HISTORY       Social History     Tobacco Use    Smoking status: Former     Types: Cigars     Quit date: 2019     Years since quittin.0    Smokeless tobacco: Never   Vaping Use    Vaping Use: Never used   Substance Use Topics    Alcohol use: No    Drug use: No       SCREENINGS                         CIWA Assessment  BP: (!) 159/74  Heart Rate: 74           PHYSICAL EXAM  1 or more Elements     ED Triage Vitals [23 1436]   BP Temp Temp Source Heart Rate Resp SpO2 Height Weight   (!) 159/74 97.5 °F (36.4 °C) Oral 74 18 98 % 6' 4\" (1.93 m) 224 lb (101.6 kg)       Physical Exam  Vitals and nursing note reviewed. Constitutional:       Appearance: Normal appearance. He is not diaphoretic. HENT:      Head: Normocephalic and atraumatic. Nose: Nose normal.      Mouth/Throat:      Mouth: Mucous membranes are moist.   Eyes:      General:         Right eye: No discharge. Left eye: No discharge. Extraocular Movements: Extraocular movements intact. Pupils: Pupils are equal, round, and reactive to light. Pulmonary:      Effort: Pulmonary effort is normal. No respiratory distress. Abdominal:      General: Abdomen is flat. Palpations: Abdomen is soft. Tenderness: There is no abdominal tenderness. There is no right CVA tenderness, left CVA tenderness, guarding or rebound. Musculoskeletal:         General: Normal range of motion. Cervical back: Normal range of motion and neck supple. Skin:     General: Skin is warm and dry.       Coloration: Skin is not pale.   Neurological:      Mental Status: He is alert and oriented to person, place, and time. Psychiatric:         Mood and Affect: Mood normal.         Behavior: Behavior normal.           DIAGNOSTIC RESULTS   LABS:    Labs Reviewed   CBC WITH AUTO DIFFERENTIAL - Abnormal; Notable for the following components:       Result Value    WBC 11.3 (*)     Neutrophils Absolute 9.3 (*)     All other components within normal limits   COMPREHENSIVE METABOLIC PANEL W/ REFLEX TO MG FOR LOW K - Abnormal; Notable for the following components:    Glucose 209 (*)     BUN 22 (*)     AST 14 (*)     All other components within normal limits   URINALYSIS WITH REFLEX TO CULTURE - Abnormal; Notable for the following components:    Glucose, Ur >=1000 (*)     Blood, Urine MODERATE (*)     All other components within normal limits   MICROSCOPIC URINALYSIS - Abnormal; Notable for the following components:    Bacteria, UA Rare (*)     All other components within normal limits       When ordered only abnormal lab results are displayed. All other labs were within normal range or not returned as of this dictation. EKG: When ordered, EKG's are interpreted by the Emergency Department Physician in the absence of a cardiologist.  Please see their note for interpretation of EKG. RADIOLOGY:   Non-plain film images such as CT, Ultrasound and MRI are read by the radiologist. [facility] radiographic images are visualized and preliminarily interpreted by the ED Provider with the below findings:    Mild to moderate right hydronephrosis and hydroureter secondary to 7 mm calculus at the right ureterovesical scapular junction. Interpretation per the Radiologist below, if available at the time of this note:    CT ABDOMEN PELVIS WO CONTRAST Additional Contrast? None   Final Result   Mild to moderate right hydronephrosis and hydroureter secondary to a 7 mm   calculus at the right ureterovesical junction. Stable liver cysts. Left renal cysts noted. 3 mm calculus in the upper pole   of the left kidney. Diverticular disease of the descending colon. CT ABDOMEN PELVIS WO CONTRAST Additional Contrast? None    Result Date: 1/21/2023  EXAMINATION: CT OF THE ABDOMEN AND PELVIS WITHOUT CONTRAST 1/21/2023 2:56 pm TECHNIQUE: CT of the abdomen and pelvis was performed without the administration of intravenous contrast. Multiplanar reformatted images are provided for review. Automated exposure control, iterative reconstruction, and/or weight based adjustment of the mA/kV was utilized to reduce the radiation dose to as low as reasonably achievable. COMPARISON: 03/19/2018 HISTORY: ORDERING SYSTEM PROVIDED HISTORY: right flank pain TECHNOLOGIST PROVIDED HISTORY: Reason for exam:->right flank pain Additional Contrast?->None Decision Support Exception - unselect if not a suspected or confirmed emergency medical condition->Emergency Medical Condition (MA) Reason for Exam: rt flank pain x 3 days worse today, hx kidney stones. Relevant Medical/Surgical History: hx laser prostate sx FINDINGS: Lower Chest: Unremarkable Organs: There is a stable 9 mm cyst of the liver. The gallbladder, pancreas and spleen appear normal.  The adrenal glands appear normal.  There are 2 left renal cysts. There are left peripelvic renal cysts. There is a 3 mm calculus in 1 of the left upper pole calices. There is mild to moderate right hydronephrosis and hydroureter secondary to a 7 mm calculus at the right ureterovesical junction. GI/Bowel: The stomach, small bowel loops appear unremarkable. There is diverticular disease of the descending colon without evidence of diverticulitis. Pelvis: The bladder appears unremarkable. Fiducial markers are noted in the prostate. The rectum appears normal.  There is no pelvic adenopathy.  Peritoneum/Retroperitoneum: Unremarkable Bones/Soft Tissues: Unremarkable     Mild to moderate right hydronephrosis and hydroureter secondary to a 7 mm calculus at the right ureterovesical junction. Stable liver cysts. Left renal cysts noted. 3 mm calculus in the upper pole of the left kidney. Diverticular disease of the descending colon. No results found. PROCEDURES   Unless otherwise noted below, none     Procedures    CRITICAL CARE TIME (.cctime)   None    PAST MEDICAL HISTORY      has a past medical history of Cancer ([facility] [address] Hemorrhoids, external, Hepatitis, Hernia of unspecified site of abdominal cavity without mention of obstruction or gangrene, Kidney disease, Measles, and Type II or unspecified type diabetes mellitus without mention of complication, not stated as uncontrolled. Chronic Conditions affecting Care: Kidney disease and kidney stones    EMERGENCY DEPARTMENT COURSE and DIFFERENTIAL DIAGNOSIS/MDM:   Vitals:    Vitals:    01/21/23 1436   BP: (!) 159/74   Pulse: 74   Resp: 18   Temp: 97.5 °F (36.4 °C)   TempSrc: Oral   SpO2: 98%   Weight: 224 lb (101.6 kg)   Height: 6' 4\" (1.93 m)       Patient was given the following medications:  Medications   ketorolac (TORADOL) injection 15 mg (15 mg IntraVENous Given 1/21/23 1456)             Is this patient to be included in the SEP-1 Core Measure due to severe sepsis or septic shock? No   Exclusion criteria - the patient is NOT to be included for SEP-1 Core Measure due to: Infection is not suspected    CONSULTS: (Who and What was discussed)  IP CONSULT TO UROLOGY              CC/HPI Summary, DDx, ED Course, and Reassessment: Patient was evaluated in the emergency department today for flank pain. He received IV Toradol for pain relief. Differential diagnosis includes kidney stone, UTI, diverticulitis. Work-up results include:  CBC with white blood cell count of 11.3, absolute neutrophils 9.3. No bandemia. Hemoglobin is stable. CMP without acute electrolyte abnormality. Renal function with BUN of 22 and creatinine 1.1  Urinalysis with glucose and moderate amount of blood.   It is nitrite leukocyte negative. CT abdomen pelvis obtained which does show mild to moderate right hydronephrosis with hydroureter secondary to a 7 mm calculus at the right ureterovesicular junction. Discussion was had with the patient regarding all lab and imaging results. Upon reevaluation he has had significant pain relief with Toradol and now rates pain as 0/10. Given the size of the stone I did consult with urology. I spoke with Dr. Rainer Rodriguez who recommended admission if the patient is requiring significant pain control otherwise if pain is able to be controlled he may be discharged with follow-up early next week. It does appear that the patient has past 50% of the stone into the bladder so it is reasonable to think that he will continue to pass the stone on his own. I did discuss return precautions with the patient. Disposition Considerations (include 1 Tests not done, Shared Decision Making, Pt Expectation of Test or Tx.): Appropriate for discharge home with strict return precautions and follow-up with urology.       Results for orders placed or performed during the hospital encounter of 01/21/23   CBC with Auto Differential   Result Value Ref Range    WBC 11.3 (H) 4.0 - 11.0 K/uL    RBC 5.38 4.20 - 5.90 M/uL    Hemoglobin 14.5 13.5 - 17.5 g/dL    Hematocrit 45.1 40.5 - 52.5 %    MCV 83.8 80.0 - 100.0 fL    MCH 27.0 26.0 - 34.0 pg    MCHC 32.2 31.0 - 36.0 g/dL    RDW 14.5 12.4 - 15.4 %    Platelets 904 726 - 233 K/uL    MPV 7.6 5.0 - 10.5 fL    Neutrophils % 82.1 %    Lymphocytes % 8.9 %    Monocytes % 6.3 %    Eosinophils % 2.1 %    Basophils % 0.6 %    Neutrophils Absolute 9.3 (H) 1.7 - 7.7 K/uL    Lymphocytes Absolute 1.0 1.0 - 5.1 K/uL    Monocytes Absolute 0.7 0.0 - 1.3 K/uL    Eosinophils Absolute 0.2 0.0 - 0.6 K/uL    Basophils Absolute 0.1 0.0 - 0.2 K/uL   Comprehensive Metabolic Panel w/ Reflex to MG   Result Value Ref Range    Sodium 139 136 - 145 mmol/L    Potassium reflex Magnesium 4.0 3.5 - 5.1 mmol/L Chloride 103 99 - 110 mmol/L    CO2 23 21 - 32 mmol/L    Anion Gap 13 3 - 16    Glucose 209 (H) 70 - 99 mg/dL    BUN 22 (H) 7 - 20 mg/dL    Creatinine 1.1 0.8 - 1.3 mg/dL    Est, Glom Filt Rate >60 >60    Calcium 9.3 8.3 - 10.6 mg/dL    Total Protein 6.8 6.4 - 8.2 g/dL    Albumin 4.4 3.4 - 5.0 g/dL    Albumin/Globulin Ratio 1.8 1.1 - 2.2    Total Bilirubin 1.0 0.0 - 1.0 mg/dL    Alkaline Phosphatase 75 40 - 129 U/L    ALT 18 10 - 40 U/L    AST 14 (L) 15 - 37 U/L   Urinalysis with Reflex to Culture    Specimen: Urine   Result Value Ref Range    Color, UA Yellow Straw/Yellow    Clarity, UA Clear Clear    Glucose, Ur >=1000 (A) Negative mg/dL    Bilirubin Urine Negative Negative    Ketones, Urine Negative Negative mg/dL    Specific Gravity, UA 1.010 1.005 - 1.030    Blood, Urine MODERATE (A) Negative    pH, UA 5.5 5.0 - 8.0    Protein, UA Negative Negative mg/dL    Urobilinogen, Urine 0.2 <2.0 E.U./dL    Nitrite, Urine Negative Negative    Leukocyte Esterase, Urine Negative Negative    Microscopic Examination YES     Urine Type NotGiven     Urine Reflex to Culture Not Indicated    Microscopic Urinalysis   Result Value Ref Range    WBC, UA 3-5 0 - 5 /HPF    RBC, UA 3-4 0 - 4 /HPF    Bacteria, UA Rare (A) None Seen /HPF         I estimate there is LOW risk for ACUTE APPENDICITIS, BOWEL OBSTRUCTION, CHOLECYSTITIS, DIVERTICULITIS, INCARCERATED HERNIA, PANCREATITIS, or PERFORATED BOWEL or ULCER, thus I consider the discharge disposition reasonable. Also, there is no evidence or peritonitis, sepsis, or toxicity. Monica Velasquez and I have discussed the diagnosis and risks, and we agree with discharging home to follow-up with their primary doctor. We also discussed returning to the Emergency Department immediately if new or worsening symptoms occur. We have discussed the symptoms which are most concerning (e.g., bloody stool, fever, changing or worsening pain, vomiting) that necessitate immediate return.      FINAL Impression    1. Kidney stone    2. Flank pain        Blood pressure (!) 145/68, pulse 70, temperature 97.5 °F (36.4 °C), temperature source Oral, resp. rate 16, height 6' 4\" (1.93 m), weight 224 lb (101.6 kg), SpO2 99 %. I am the Primary Clinician of Record. FINAL IMPRESSION      1. Kidney stone    2. Flank pain          DISPOSITION/PLAN     DISPOSITION Decision To Discharge 01/21/2023 04:38:03 PM      PATIENT REFERRED TO:  [facility] [address] [place] [address]  [phone]    Call in 2 days      [facility]  ED  [address] [facility] [address]  Go to   If symptoms worsen    DISCHARGE MEDICATIONS:  New Prescriptions    KETOROLAC (TORADOL) 10 MG TABLET    Take 1 tablet by mouth every 6 hours as needed for Pain    ONDANSETRON (ZOFRAN-ODT) 4 MG DISINTEGRATING TABLET    Place 1 tablet under the tongue every 8 hours as needed for Nausea or Vomiting May Sub regular tablet (non-ODT) if insurance does not cover ODT.     TAMSULOSIN (FLOMAX) 0.4 MG CAPSULE    Take 1 capsule by mouth daily for 5 days       DISCONTINUED MEDICATIONS:  Discontinued Medications    No medications on file              (Please note that portions of this note were completed with a voice recognition program.  Efforts were made to edit the dictations but occasionally words are mis-transcribed.)    AKMILLA Cardenas (electronically signed)           KAMILLA Cardenas  01/22/23 5635

## 2023-02-08 NOTE — PLAN OF CARE
Problem: Cardiovascular  Goal: Hemodynamic stability  11/13/2021 0111 by Adrianne Bella  Outcome: Ongoing     Problem: Respiratory  Goal: No pulmonary complications  30/93/2200 0111 by Adrianne Bella  Outcome: Ongoing  Goal: O2 Sat > 90%  11/13/2021 0111 by Adrianne Bella  Outcome: Ongoing     Problem: GI  Goal: No bowel complications  54/07/5476 0111 by Adrianne Bella  Outcome: Ongoing     Problem:   Goal: Adequate urinary output    Outcome: Ongoing     Problem: Skin Integrity/Risk  Goal: No skin breakdown during hospitalization    Outcome: Ongoing     Problem: HEMODYNAMIC STATUS  Goal: Patient has stable vital signs and fluid balance    Outcome: Ongoing     Problem: ACTIVITY INTOLERANCE/IMPAIRED MOBILITY  Goal: Mobility/activity is maintained at optimum level for patient    Outcome: Ongoing     Problem: COMMUNICATION IMPAIRMENT  Goal: Ability to express needs and understand communication    Outcome: Ongoing
Problem: Cardiovascular  Goal: Hemodynamic stability  Outcome: Ongoing     Problem: Respiratory  Goal: No pulmonary complications  Outcome: Ongoing  Goal: O2 Sat > 90%  Outcome: Ongoing     Problem: GI  Goal: No bowel complications  Outcome: Ongoing     Problem:   Goal: Adequate urinary output  Outcome: Ongoing     Problem: Skin Integrity/Risk  Goal: No skin breakdown during hospitalization  Outcome: Ongoing     Problem: HEMODYNAMIC STATUS  Goal: Patient has stable vital signs and fluid balance  Outcome: Ongoing     Problem: ACTIVITY INTOLERANCE/IMPAIRED MOBILITY  Goal: Mobility/activity is maintained at optimum level for patient  Outcome: Ongoing     Problem: COMMUNICATION IMPAIRMENT  Goal: Ability to express needs and understand communication  Outcome: Ongoing
Problem: Cardiovascular  Goal: Hemodynamic stability  Outcome: Ongoing     Problem: Respiratory  Goal: No pulmonary complications  Outcome: Ongoing  Goal: O2 Sat > 90%  Outcome: Ongoing     Problem: GI  Goal: No bowel complications  Outcome: Ongoing     Problem:   Goal: Adequate urinary output  Outcome: Ongoing     Problem: Skin Integrity/Risk  Goal: No skin breakdown during hospitalization  Outcome: Ongoing     Problem: HEMODYNAMIC STATUS  Goal: Patient has stable vital signs and fluid balance  Outcome: Ongoing     Problem: ACTIVITY INTOLERANCE/IMPAIRED MOBILITY  Goal: Mobility/activity is maintained at optimum level for patient  Outcome: Ongoing     Problem: COMMUNICATION IMPAIRMENT  Goal: Ability to express needs and understand communication  Outcome: Ongoing
180.34

## 2023-03-23 ENCOUNTER — TELEPHONE (OUTPATIENT)
Dept: CARDIOLOGY CLINIC | Age: 78
End: 2023-03-23

## 2023-03-28 NOTE — PROGRESS NOTES
[facility]  [phone]      Attending Physician: Tere Zuluaga MD  Reason for Consultation/Chief Complaint: New patient, atrial fibrillation    Subjective   History of Present Illness:  Dalene Cogan is a 68 y.o. patient presenting today as a new patient referral from his PCP Dr. Tere Zuluaga MD for further cardiac evaluation of atrial fibrillation. Prior cardiac testing includes 11/15/21 ECHO showed EF of 55-60%. No regional wall motion abnormalities seen. Bubble study was performed and fails to show evidence of shunting. Today he reports that he went to his PCP who did an EKG, PCP was concerned with findings and referred him to cardiology. He reports that he does not feel anything with the afib or feel any different than he use to. He denies active chest pain dizziness syncope or edema. Has occ sob/fatigue. Past Medical History:   has a past medical history of Cancer ([facility] [address] Hemorrhoids, external, Hepatitis, Hernia of unspecified site of abdominal cavity without mention of obstruction or gangrene, Kidney disease, Measles, and Type II or unspecified type diabetes mellitus without mention of complication, not stated as uncontrolled. Surgical History:   has a past surgical history that includes Nasal polyp surgery and Cystocopy (Right, 03/19/2018). Social History:   reports that he quit smoking about 4 years ago. His smoking use included cigars. He has never used smokeless tobacco. He reports that he does not drink alcohol and does not use drugs. Family History:  family history includes Diabetes in his mother; Stroke in his father and mother. Home Medications:  Were reviewed and are listed in nursing record and/or below  Prior to Admission medications    Medication Sig Start Date End Date Taking?  Authorizing Provider   ELIQUIS 5 MG TABS tablet Take 5 mg by mouth in the morning and at bedtime 3/13/23  Yes Historical Provider, MD   dilTIAZem (TIAZAC) 120 MG

## 2023-03-30 ENCOUNTER — TELEPHONE (OUTPATIENT)
Dept: CARDIOLOGY CLINIC | Age: 78
End: 2023-03-30

## 2023-03-30 ENCOUNTER — OFFICE VISIT (OUTPATIENT)
Dept: CARDIOLOGY CLINIC | Age: 78
End: 2023-03-30
Payer: COMMERCIAL

## 2023-03-30 VITALS
WEIGHT: 230.4 LBS | DIASTOLIC BLOOD PRESSURE: 76 MMHG | HEIGHT: 76 IN | BODY MASS INDEX: 28.06 KG/M2 | HEART RATE: 92 BPM | SYSTOLIC BLOOD PRESSURE: 138 MMHG | OXYGEN SATURATION: 98 %

## 2023-03-30 DIAGNOSIS — E11.8 DM TYPE 2, CONTROLLED, WITH COMPLICATION (HCC): ICD-10-CM

## 2023-03-30 DIAGNOSIS — I49.9 IRREGULAR HEART RATE: ICD-10-CM

## 2023-03-30 DIAGNOSIS — E78.5 DYSLIPIDEMIA: ICD-10-CM

## 2023-03-30 DIAGNOSIS — I10 HTN (HYPERTENSION), BENIGN: Primary | ICD-10-CM

## 2023-03-30 DIAGNOSIS — R06.02 SOB (SHORTNESS OF BREATH): ICD-10-CM

## 2023-03-30 DIAGNOSIS — I48.91 ATRIAL FIBRILLATION, UNSPECIFIED TYPE (HCC): ICD-10-CM

## 2023-03-30 PROCEDURE — 1123F ACP DISCUSS/DSCN MKR DOCD: CPT | Performed by: INTERNAL MEDICINE

## 2023-03-30 PROCEDURE — 93000 ELECTROCARDIOGRAM COMPLETE: CPT | Performed by: INTERNAL MEDICINE

## 2023-03-30 PROCEDURE — 99204 OFFICE O/P NEW MOD 45 MIN: CPT | Performed by: INTERNAL MEDICINE

## 2023-03-30 PROCEDURE — 3078F DIAST BP <80 MM HG: CPT | Performed by: INTERNAL MEDICINE

## 2023-03-30 PROCEDURE — 3075F SYST BP GE 130 - 139MM HG: CPT | Performed by: INTERNAL MEDICINE

## 2023-03-30 RX ORDER — APIXABAN 5 MG/1
5 TABLET, FILM COATED ORAL 2 TIMES DAILY
COMMUNITY
Start: 2023-03-13

## 2023-03-30 RX ORDER — DILTIAZEM HYDROCHLORIDE 120 MG/1
120 CAPSULE, EXTENDED RELEASE ORAL DAILY
COMMUNITY
Start: 2023-03-13

## 2023-03-30 NOTE — TELEPHONE ENCOUNTER
Monitor registered by [employer] of monitor 14 Days   Monitor ordered by Kandice 27 successful prior to pt leaving office?  Monitor shipped to pt address on file

## 2023-03-30 NOTE — PATIENT INSTRUCTIONS
PLAN:  Echo to evaluate for heart size and function  Stress Gracie test  to evaluate for any potential blockages within the heart arteries  CT Calcium  score to assess for plaque in the arteries.  Out of pocket expense of $130  Call [phone] to schedule echo stress test and ct calcium score  Cardiac event monitor-2 weeks for atrial fibrillation-will mail to pt home  Referral to Electrophysiology MD for atrial fibrillation management  TSH, liver and lipids

## 2023-04-06 ENCOUNTER — TELEPHONE (OUTPATIENT)
Dept: CARDIOLOGY CLINIC | Age: 78
End: 2023-04-06

## 2023-04-06 NOTE — TELEPHONE ENCOUNTER
Pt called and stated that he is going to wait until after he wears the heart monitor to do the stress test and echo. Fyi. If that's not ok pt would like to be notified.

## 2023-04-24 ENCOUNTER — HOSPITAL ENCOUNTER (OUTPATIENT)
Dept: CT IMAGING | Age: 78
Discharge: HOME | End: 2023-04-24
Payer: COMMERCIAL

## 2023-04-24 ENCOUNTER — HOSPITAL ENCOUNTER (OUTPATIENT)
Dept: NON INVASIVE DIAGNOSTICS | Age: 78
Discharge: HOME | End: 2023-04-24
Payer: COMMERCIAL

## 2023-04-24 ENCOUNTER — HOSPITAL ENCOUNTER (OUTPATIENT)
Dept: NUCLEAR MEDICINE | Age: 78
Discharge: HOME | End: 2023-04-24
Payer: COMMERCIAL

## 2023-04-24 DIAGNOSIS — R06.02 SOB (SHORTNESS OF BREATH): ICD-10-CM

## 2023-04-24 DIAGNOSIS — I49.9 IRREGULAR HEART RATE: ICD-10-CM

## 2023-04-24 DIAGNOSIS — E78.5 DYSLIPIDEMIA: ICD-10-CM

## 2023-04-24 LAB
LV EF: 55 %
LV EF: 60 %
LVEF MODALITY: NORMAL
LVEF MODALITY: NORMAL

## 2023-04-24 PROCEDURE — 3430000000 HC RX DIAGNOSTIC RADIOPHARMACEUTICAL: Performed by: INTERNAL MEDICINE

## 2023-04-24 PROCEDURE — 78452 HT MUSCLE IMAGE SPECT MULT: CPT

## 2023-04-24 PROCEDURE — 6360000002 HC RX W HCPCS: Performed by: INTERNAL MEDICINE

## 2023-04-24 PROCEDURE — 93017 CV STRESS TEST TRACING ONLY: CPT

## 2023-04-24 PROCEDURE — 93306 TTE W/DOPPLER COMPLETE: CPT

## 2023-04-24 PROCEDURE — A9502 TC99M TETROFOSMIN: HCPCS | Performed by: INTERNAL MEDICINE

## 2023-04-24 PROCEDURE — 75571 CT HRT W/O DYE W/CA TEST: CPT

## 2023-04-24 RX ADMIN — TETROFOSMIN 11 MILLICURIE: 1.38 INJECTION, POWDER, LYOPHILIZED, FOR SOLUTION INTRAVENOUS at 08:16

## 2023-04-24 RX ADMIN — REGADENOSON 0.4 MG: 0.08 INJECTION, SOLUTION INTRAVENOUS at 09:34

## 2023-04-24 RX ADMIN — TETROFOSMIN 33.8 MILLICURIE: 1.38 INJECTION, POWDER, LYOPHILIZED, FOR SOLUTION INTRAVENOUS at 09:34

## 2023-04-24 NOTE — PROGRESS NOTES
Pt completed stress portion of cardiac stress test. Patient complained of 3/10 chest heaviness and headache after lexiscan, all symptoms resolved with rest. Pt is discharged to nuclear department for final scan. Nuclear tech will remove PIV. Discharge instructions given to pt. Pt verbalizes understanding to discharge instructions.

## 2023-04-24 NOTE — PROGRESS NOTES
Patient arrived to stress lab for stress test.  Patient was educated on procedure, all questions answered, and consent verified/obtained.

## 2023-04-25 ENCOUNTER — TELEPHONE (OUTPATIENT)
Dept: CARDIOLOGY CLINIC | Age: 78
End: 2023-04-25

## 2023-04-25 NOTE — TELEPHONE ENCOUNTER
Pt called again requesting test results. Advised Pt that we would have medical staff call him and explain results.   Please advise Pt at [phone]

## 2023-04-25 NOTE — TELEPHONE ENCOUNTER
Pt called for the results of his testing. Testing was done on 04/24/2023 at [facility]. Please advise.

## 2023-04-25 NOTE — TELEPHONE ENCOUNTER
Called and spoke with pt. Relayed CT Calcium and stress test results to him. He verbalized understanding of abnormal test results.

## 2023-04-26 ENCOUNTER — TELEPHONE (OUTPATIENT)
Dept: CARDIOLOGY CLINIC | Age: 78
End: 2023-04-26

## 2023-04-26 NOTE — TELEPHONE ENCOUNTER
----- Message from Mar Naranjo MD sent at 4/26/2023 10:37 AM EDT -----  Let patient know echo test shows normal heart function, no new orders or changes at this time. Thanks.

## 2023-05-01 ENCOUNTER — HOSPITAL ENCOUNTER (OUTPATIENT)
Dept: CARDIAC CATH/INVASIVE PROCEDURES | Age: 78
Discharge: HOME | End: 2023-05-01
Attending: INTERNAL MEDICINE | Admitting: INTERNAL MEDICINE
Payer: COMMERCIAL

## 2023-05-01 VITALS — HEIGHT: 76 IN | WEIGHT: 230.5 LBS | BODY MASS INDEX: 28.07 KG/M2

## 2023-05-01 LAB
ANION GAP SERPL CALCULATED.3IONS-SCNC: 12 MMOL/L (ref 3–16)
BUN SERPL-MCNC: 17 MG/DL (ref 7–20)
CALCIUM SERPL-MCNC: 9.6 MG/DL (ref 8.3–10.6)
CHLORIDE SERPL-SCNC: 100 MMOL/L (ref 99–110)
CHOLEST SERPL-MCNC: 127 MG/DL (ref 0–199)
CO2 SERPL-SCNC: 26 MMOL/L (ref 21–32)
CREAT SERPL-MCNC: 1.1 MG/DL (ref 0.8–1.3)
DEPRECATED RDW RBC AUTO: 14.8 % (ref 12.4–15.4)
GFR SERPLBLD CREATININE-BSD FMLA CKD-EPI: >60 ML/MIN/{1.73_M2}
GLUCOSE SERPL-MCNC: 179 MG/DL (ref 70–99)
HCT VFR BLD AUTO: 44.7 % (ref 40.5–52.5)
HDLC SERPL-MCNC: 55 MG/DL (ref 40–60)
HGB BLD-MCNC: 14.5 G/DL (ref 13.5–17.5)
LDLC SERPL CALC-MCNC: 59 MG/DL
LV EF: 60 %
LVEF MODALITY: NORMAL
MCH RBC QN AUTO: 26.6 PG (ref 26–34)
MCHC RBC AUTO-ENTMCNC: 32.5 G/DL (ref 31–36)
MCV RBC AUTO: 82.1 FL (ref 80–100)
PLATELET # BLD AUTO: 205 K/UL (ref 135–450)
PMV BLD AUTO: 7.3 FL (ref 5–10.5)
POTASSIUM SERPL-SCNC: 3.7 MMOL/L (ref 3.5–5.1)
RBC # BLD AUTO: 5.45 M/UL (ref 4.2–5.9)
SODIUM SERPL-SCNC: 138 MMOL/L (ref 136–145)
TRIGL SERPL-MCNC: 63 MG/DL (ref 0–150)
VLDLC SERPL CALC-MCNC: 13 MG/DL
WBC # BLD AUTO: 9.5 K/UL (ref 4–11)

## 2023-05-01 PROCEDURE — 6360000004 HC RX CONTRAST MEDICATION

## 2023-05-01 PROCEDURE — 80061 LIPID PANEL: CPT

## 2023-05-01 PROCEDURE — C1894 INTRO/SHEATH, NON-LASER: HCPCS

## 2023-05-01 PROCEDURE — 93458 L HRT ARTERY/VENTRICLE ANGIO: CPT | Performed by: INTERNAL MEDICINE

## 2023-05-01 PROCEDURE — 85027 COMPLETE CBC AUTOMATED: CPT

## 2023-05-01 PROCEDURE — 6370000000 HC RX 637 (ALT 250 FOR IP)

## 2023-05-01 PROCEDURE — C1769 GUIDE WIRE: HCPCS

## 2023-05-01 PROCEDURE — 2500000003 HC RX 250 WO HCPCS

## 2023-05-01 PROCEDURE — 93005 ELECTROCARDIOGRAM TRACING: CPT | Performed by: INTERNAL MEDICINE

## 2023-05-01 PROCEDURE — 85347 COAGULATION TIME ACTIVATED: CPT

## 2023-05-01 PROCEDURE — 2709999900 HC NON-CHARGEABLE SUPPLY

## 2023-05-01 PROCEDURE — 6360000002 HC RX W HCPCS

## 2023-05-01 PROCEDURE — 93458 L HRT ARTERY/VENTRICLE ANGIO: CPT

## 2023-05-01 PROCEDURE — 80048 BASIC METABOLIC PNL TOTAL CA: CPT

## 2023-05-01 PROCEDURE — 99152 MOD SED SAME PHYS/QHP 5/>YRS: CPT | Performed by: INTERNAL MEDICINE

## 2023-05-01 RX ORDER — SODIUM CHLORIDE 0.9 % (FLUSH) 0.9 %
5-40 SYRINGE (ML) INJECTION EVERY 12 HOURS SCHEDULED
Status: DISCONTINUED | OUTPATIENT
Start: 2023-05-01 | End: 2023-05-01 | Stop reason: HOSPADM

## 2023-05-01 RX ORDER — MIDAZOLAM HYDROCHLORIDE 1 MG/ML
INJECTION INTRAMUSCULAR; INTRAVENOUS
Status: COMPLETED | OUTPATIENT
Start: 2023-05-01 | End: 2023-05-01

## 2023-05-01 RX ORDER — SODIUM CHLORIDE 0.9 % (FLUSH) 0.9 %
5-40 SYRINGE (ML) INJECTION PRN
Status: DISCONTINUED | OUTPATIENT
Start: 2023-05-01 | End: 2023-05-01 | Stop reason: HOSPADM

## 2023-05-01 RX ORDER — ASPIRIN 81 MG/1
81 TABLET, CHEWABLE ORAL ONCE
Status: COMPLETED | OUTPATIENT
Start: 2023-05-01 | End: 2023-05-01

## 2023-05-01 RX ORDER — LORAZEPAM 0.5 MG/1
0.5 TABLET ORAL
Status: DISCONTINUED | OUTPATIENT
Start: 2023-05-01 | End: 2023-05-01 | Stop reason: HOSPADM

## 2023-05-01 RX ORDER — FENTANYL CITRATE 50 UG/ML
INJECTION, SOLUTION INTRAMUSCULAR; INTRAVENOUS
Status: COMPLETED | OUTPATIENT
Start: 2023-05-01 | End: 2023-05-01

## 2023-05-01 RX ORDER — ASPIRIN 325 MG
325 TABLET ORAL ONCE
Status: DISCONTINUED | OUTPATIENT
Start: 2023-05-01 | End: 2023-05-01 | Stop reason: HOSPADM

## 2023-05-01 RX ORDER — ONDANSETRON 2 MG/ML
4 INJECTION INTRAMUSCULAR; INTRAVENOUS EVERY 6 HOURS PRN
Status: DISCONTINUED | OUTPATIENT
Start: 2023-05-01 | End: 2023-05-01 | Stop reason: HOSPADM

## 2023-05-01 RX ORDER — SODIUM CHLORIDE 9 MG/ML
INJECTION, SOLUTION INTRAVENOUS PRN
Status: DISCONTINUED | OUTPATIENT
Start: 2023-05-01 | End: 2023-05-01 | Stop reason: HOSPADM

## 2023-05-01 RX ORDER — APIXABAN 5 MG/1
5 TABLET, FILM COATED ORAL 2 TIMES DAILY
Qty: 60 TABLET | Refills: 3 | Status: SHIPPED | OUTPATIENT
Start: 2023-05-02

## 2023-05-01 RX ORDER — HEPARIN SODIUM 1000 [USP'U]/ML
INJECTION, SOLUTION INTRAVENOUS; SUBCUTANEOUS
Status: COMPLETED | OUTPATIENT
Start: 2023-05-01 | End: 2023-05-01

## 2023-05-01 RX ORDER — ACETAMINOPHEN 325 MG/1
650 TABLET ORAL EVERY 4 HOURS PRN
Status: DISCONTINUED | OUTPATIENT
Start: 2023-05-01 | End: 2023-05-01 | Stop reason: HOSPADM

## 2023-05-01 RX ADMIN — FENTANYL CITRATE 50 MCG: 50 INJECTION, SOLUTION INTRAMUSCULAR; INTRAVENOUS at 10:47

## 2023-05-01 RX ADMIN — MIDAZOLAM HYDROCHLORIDE 1 MG: 1 INJECTION INTRAMUSCULAR; INTRAVENOUS at 10:40

## 2023-05-01 RX ADMIN — ASPIRIN 81 MG: 81 TABLET, CHEWABLE ORAL at 08:40

## 2023-05-01 RX ADMIN — FENTANYL CITRATE 50 MCG: 50 INJECTION, SOLUTION INTRAMUSCULAR; INTRAVENOUS at 10:40

## 2023-05-01 RX ADMIN — MIDAZOLAM HYDROCHLORIDE 1 MG: 1 INJECTION INTRAMUSCULAR; INTRAVENOUS at 10:47

## 2023-05-01 RX ADMIN — HEPARIN SODIUM 5000 UNITS: 1000 INJECTION, SOLUTION INTRAVENOUS; SUBCUTANEOUS at 10:43

## 2023-05-01 NOTE — PROCEDURES
CARDIAC CATHETERIZATION REPORT     Procedure Date:  2023  Patient Name: Vivian Ramirez  MRN: [id number] : 1945      INDICATION     Abnl stress test  aflutter    PROCEDURES PERFORMED     Left heart catheterization  LVgram  Coronary angiogam  Coronary cath  Monitoring of moderate conscious sedation        PROCEDURE DESCRIPTION   Risks/benefits/alternatives/outcomes were discussed with patient and/or family and informed consent was obtained. Using the [facility] scale, the patient's right radial artery was found to be a level B. Patient was prepped draped in the usual sterile fashion. Local anaesthetic was applied over puncture site. Using a back wall technique, a 6 Divehi Terumo sheath was inserted into right radial artery. Verapamil, nitroglycerin were administered through the sheath. Heparin was administered. Diagnostic 5fr pigtail, ultra catheters were used for diagnostic angiograms. At the conclusion of the procedure, a TR band was placed over the puncture site and hemostasis was obtained. There were no immediate complications. I supervised sedation from 10:40am to 11:06am with versed 2mg/fentanyl 100mcg during the procedure. An independent trained observer pushed meds at my direction. We monitored the patient's level of consciousness and vital signs/physiologic status throughout the procedure duration (see times listed previously). 210cc contrast was utilized. <20cc EBL. FINDINGS     LVGRAM    LVEDP 15   GRADIENT ACROSS AORTIC VALVE No signif gradient   LV FUNCTION EF 60%   WALL MOTION Normal   MITRAL REGURGITATION Mild         CORONARY ARTERIES    LM Less than 10% [id number] stenosis  Calcified. LAD Calcified, prox less 10% stenosis, mid-distal 50-60% stenosis. Apical, very distal LAD has 75-80% stenosis (small vessel distally)    D1 is medium to large vessel with ostial/prox 60-70% stenosis. LCX Prox-mid 60-70% stenosis.     OM1 is medium sized vessel

## 2023-05-01 NOTE — H&P
Brief Pre-Op Note/Sedation Assessment      Sunny Allen  1945  [id number]  8:46 AM    Planned Procedure: Cardiac Catheterization Procedure  Post Procedure Plan: Return to same level of care  Consent: I have discussed with the patient and/or the patient representative the indication, alternatives, and the possible risks and/or complications of the planned procedure and the anesthesia methods. The patient and/or patient representative appear to understand and agree to proceed. DISCUSSION OF CARDIAC CATHETERIZATION PROCEDURES: The procedures, indications, risks and alternatives have been discussed with the patient and, as appropriate, with the patient's guardian . Risks discussed included, but are not limited to, bleeding, development of blood clots/emboli, damage to blood vessels, renal failure, malignant cardiac arrhythmias, stroke, heart attack, emergent coronary bypass surgery, death, dye allergy. The patient (and guardian as appropriate) expressed understanding of the aforementioned and wished to proceed. Chief Complaint:   Anginal Equivalent  Dyspnea      Indications for Cath Procedure:  Presentation:  Worsening Angina  2. Anginal Classification within 2 weeks:  CCS III - Symptoms with everyday living activities, i.e., moderate limitation  3. Angina Symptoms Assessment:  Atypical Chest Pain  4. Heart Failure Class within last 2 weeks:  No symptoms  5. Cardiovascular Instability:  No    Prior Ischemic Workup/Eval:  Pre-Procedural Medications: Yes: Aspirin, Ca Channel Blockers, and STATIN  2. Stress Test Completed? Yes:  Stress or Imaging Studies Performed (within ANY time period):   Type:  Stress Nuclear  Results:  Positive:  Myocardial Perfusion Defects (Nuclear) Extent of Ischemia:  Intermediate    Does Patient need surgery?   Cath Valve Surgery:  No    Pre-Procedure Medical History:  Vital Signs:  Ht 6' 4\" (1.93 m)   Wt 230 lb 8 oz (104.6 kg)   BMI 28.06 kg/m²     /77  HR

## 2023-05-01 NOTE — DISCHARGE INSTRUCTIONS
a strange feeling in your back, neck or jaw, or upper belly or in one or both shoulders or arms. Lightheadedness or sudden weakness. A fast or irregular heartbeat. After you call 911, the  may tell you to chew 1 adult-strength or 2 to 4                  low-dose aspirin. Wait for an ambulance. Do not try to drive yourself. Call your doctor today if :  You have any trouble breathing. Your chest pain gets worse. You are dizzy or lightheaded, or you feel like you may faint. You are not getting better as expected. You are having new or different chest pain. FOLLOW-UP APPOINTMENTS    [facility] OFFICE - Follow-up appointment on May 17th at 10:30am with Jackie Jones CNP. Please arrive 15 minutes early to complete necessary paperwork. [facility] Office [id number] [facility] [address]:  [phone]. If you are unable to make this appointment, please call to reschedule [phone]. To get to the office go to the side of the hospital at [facility], enter at the [facility], entrance that faces [address]. Take the elevator to the 3rd floor turn right off elevator then take hallway to the right. Go down the ibrahim and office will be on your right [address].

## 2023-05-02 ENCOUNTER — TELEPHONE (OUTPATIENT)
Dept: CARDIOLOGY CLINIC | Age: 78
End: 2023-05-02

## 2023-05-02 LAB
EKG ATRIAL RATE: 249 BPM
EKG DIAGNOSIS: NORMAL
EKG P AXIS: 67 DEGREES
EKG Q-T INTERVAL: 412 MS
EKG QRS DURATION: 96 MS
EKG QTC CALCULATION (BAZETT): 484 MS
EKG R AXIS: -19 DEGREES
EKG T AXIS: 62 DEGREES
EKG VENTRICULAR RATE: 83 BPM

## 2023-05-02 NOTE — TELEPHONE ENCOUNTER
----- Message from Peter Kirkland MD sent at 5/2/2023 11:38 AM EDT -----  Let patient know their lipid test is normal, and lets remind pt they should f/u w/ pulm findings on recent ct ca score test and lets refer to pulm if they don't already have appt for that. Thanks.

## 2023-05-02 NOTE — TELEPHONE ENCOUNTER
Spoke with patient and wife about results; patient stated he followed with pulm about lung lesion and he has known about it for years.  WINTER

## 2023-05-04 ENCOUNTER — TELEPHONE (OUTPATIENT)
Dept: CASE MANAGEMENT | Age: 78
End: 2023-05-04

## 2023-05-04 NOTE — TELEPHONE ENCOUNTER
Imaging report CT Cardiac Calcium with incidental findings and f/u imaging recommendations sent to Reg cMcloud MD    [address] [place] [address] BERNIE [email or web address]

## 2023-05-05 LAB — POC ACT LR: 390 SEC

## 2023-05-08 ENCOUNTER — TELEPHONE (OUTPATIENT)
Dept: CARDIOLOGY CLINIC | Age: 78
End: 2023-05-08

## 2023-05-08 NOTE — TELEPHONE ENCOUNTER
Called and spoke with pt. Relayed monitor results to pt. Verbalized understanding of results given and to follow up with EP and continue Eliquis.

## 2023-05-09 NOTE — PROGRESS NOTES
file   Housing Stability: Not on file         Family History   Problem Relation Age of Onset    Diabetes Mother     Stroke Mother     Stroke Father      Objective:     /72   Pulse (!) 45   Ht 6' 4\" (1.93 m)   Wt 228 lb 9.6 oz (103.7 kg)   SpO2 96%   BMI 27.83 kg/m²     Physical Exam  Constitutional:       Appearance: Normal appearance. HENT:      Head: Normocephalic and atraumatic. Nose: Nose normal. No rhinorrhea. Eyes:      General: No scleral icterus. Conjunctiva/sclera: Conjunctivae normal.   Cardiovascular:      Rate and Rhythm: Normal rate. Rhythm irregular. Pulmonary:      Effort: Pulmonary effort is normal.      Breath sounds: Normal breath sounds. Abdominal:      General: There is no distension. Musculoskeletal:         General: Normal range of motion. Cervical back: Normal range of motion and neck supple. Skin:     General: Skin is warm and dry. Neurological:      General: No focal deficit present. Mental Status: He is alert and oriented to person, place, and time. Psychiatric:         Mood and Affect: Mood normal.         Behavior: Behavior normal.       ECG Interpretation:  (Date: 05/02/2023)  Rhythm: Atrial flutter  Rate: 83 BPM  PAC's / PVC's present: None    Echocardiogram  (Date: 04/24/2023)  Summary   Left ventricular systolic function is normal with ejection fraction   estimated at 55%. No regional wall motion abnormalities. There is mild concentric left ventricular hypertrophy. Elevated left ventricular filling pressure. The left atrium is moderately dilated. The right atrium is mildly dilated. Mild mitral regurgitation. Mild tricuspid regurgitation. Systolic pulmonary artery pressure (SPAP) is normal estimated at 27 mmHg   (Right atrial pressure of 8 mmHg).       Stress Test (Date: 04/24/2023)   Summary   Normal LVEF >60%   Grossly normal wall motion, difficult to evaluate inferior wall d/t   extracardiac uptake   Probable diaphragmatic

## 2023-05-10 ENCOUNTER — OFFICE VISIT (OUTPATIENT)
Dept: CARDIOLOGY CLINIC | Age: 78
End: 2023-05-10
Payer: COMMERCIAL

## 2023-05-10 ENCOUNTER — TELEPHONE (OUTPATIENT)
Dept: CARDIOLOGY CLINIC | Age: 78
End: 2023-05-10

## 2023-05-10 VITALS
OXYGEN SATURATION: 96 % | DIASTOLIC BLOOD PRESSURE: 72 MMHG | BODY MASS INDEX: 27.84 KG/M2 | HEIGHT: 76 IN | HEART RATE: 45 BPM | SYSTOLIC BLOOD PRESSURE: 132 MMHG | WEIGHT: 228.6 LBS

## 2023-05-10 DIAGNOSIS — R06.83 SNORING: ICD-10-CM

## 2023-05-10 DIAGNOSIS — I25.10 CORONARY ARTERY DISEASE INVOLVING NATIVE CORONARY ARTERY OF NATIVE HEART WITHOUT ANGINA PECTORIS: ICD-10-CM

## 2023-05-10 DIAGNOSIS — I48.3 TYPICAL ATRIAL FLUTTER (HCC): Primary | ICD-10-CM

## 2023-05-10 DIAGNOSIS — Z01.818 PREOPERATIVE CLEARANCE: Primary | ICD-10-CM

## 2023-05-10 DIAGNOSIS — I48.0 PAROXYSMAL ATRIAL FIBRILLATION (HCC): ICD-10-CM

## 2023-05-10 DIAGNOSIS — R29.818 SUSPECTED SLEEP APNEA: ICD-10-CM

## 2023-05-10 DIAGNOSIS — I10 PRIMARY HYPERTENSION: ICD-10-CM

## 2023-05-10 PROBLEM — I48.91 ATRIAL FIBRILLATION (HCC): Status: ACTIVE | Noted: 2023-05-10

## 2023-05-10 PROCEDURE — 3075F SYST BP GE 130 - 139MM HG: CPT | Performed by: INTERNAL MEDICINE

## 2023-05-10 PROCEDURE — 3078F DIAST BP <80 MM HG: CPT | Performed by: INTERNAL MEDICINE

## 2023-05-10 PROCEDURE — 99205 OFFICE O/P NEW HI 60 MIN: CPT | Performed by: INTERNAL MEDICINE

## 2023-05-10 ASSESSMENT — ENCOUNTER SYMPTOMS
SNORING: 1
STRIDOR: 0
LEFT EYE: 0
WHEEZING: 0
RIGHT EYE: 0
HEMATOCHEZIA: 0
HEMATEMESIS: 0
SHORTNESS OF BREATH: 0

## 2023-05-10 NOTE — PATIENT INSTRUCTIONS
Plan:     Discussed the risks and benefits of an atrial flutter ablation with transesophageal echocardiogram. (Literature provided)   - Hold Glimepiride and metformin the morning of the procedure   - do NOT hold Eliquis for the procedure   Referral to sleep medicine clinic for sleep apnea evaluation. Continue taking current cardiac medications as prescribed. Follow up with me after the procedure.

## 2023-05-16 NOTE — TELEPHONE ENCOUNTER
Spoke with the patient and relayed procedure and medication instructions. He V/U. Instructions sent written down by the patient and his wife.

## 2023-05-22 ENCOUNTER — HOSPITAL ENCOUNTER (OUTPATIENT)
Age: 78
Discharge: HOME | End: 2023-05-22
Payer: COMMERCIAL

## 2023-05-22 PROCEDURE — 87635 SARS-COV-2 COVID-19 AMP PRB: CPT

## 2023-05-23 LAB — SARS-COV-2 N GENE RESP QL NAA+PROBE: NOT DETECTED

## 2023-05-24 ENCOUNTER — ANESTHESIA EVENT (OUTPATIENT)
Dept: CARDIAC CATH/INVASIVE PROCEDURES | Age: 78
End: 2023-05-24
Payer: COMMERCIAL

## 2023-05-25 ENCOUNTER — HOSPITAL ENCOUNTER (OUTPATIENT)
Dept: NON INVASIVE DIAGNOSTICS | Age: 78
Discharge: HOME | End: 2023-05-25
Attending: INTERNAL MEDICINE
Payer: COMMERCIAL

## 2023-05-25 ENCOUNTER — TELEPHONE (OUTPATIENT)
Dept: CARDIOLOGY CLINIC | Age: 78
End: 2023-05-25

## 2023-05-25 ENCOUNTER — HOSPITAL ENCOUNTER (OUTPATIENT)
Dept: CARDIAC CATH/INVASIVE PROCEDURES | Age: 78
Setting detail: OBSERVATION
Discharge: HOME | End: 2023-05-26
Attending: INTERNAL MEDICINE | Admitting: INTERNAL MEDICINE
Payer: COMMERCIAL

## 2023-05-25 ENCOUNTER — TELEPHONE (OUTPATIENT)
Dept: PULMONOLOGY | Age: 78
End: 2023-05-25

## 2023-05-25 ENCOUNTER — ANESTHESIA (OUTPATIENT)
Dept: CARDIAC CATH/INVASIVE PROCEDURES | Age: 78
End: 2023-05-25
Payer: COMMERCIAL

## 2023-05-25 DIAGNOSIS — R91.1 LUNG NODULE: Primary | ICD-10-CM

## 2023-05-25 PROBLEM — I48.3 TYPICAL ATRIAL FLUTTER (HCC): Status: ACTIVE | Noted: 2023-05-25

## 2023-05-25 PROBLEM — Z86.79 STATUS POST ABLATION OF ATRIAL FLUTTER: Status: ACTIVE | Noted: 2023-05-25

## 2023-05-25 PROBLEM — Z98.890 STATUS POST ABLATION OF ATRIAL FLUTTER: Status: ACTIVE | Noted: 2023-05-25

## 2023-05-25 LAB
ANION GAP SERPL CALCULATED.3IONS-SCNC: 12 MMOL/L (ref 3–16)
BUN SERPL-MCNC: 26 MG/DL (ref 7–20)
CALCIUM SERPL-MCNC: 9.3 MG/DL (ref 8.3–10.6)
CHLORIDE SERPL-SCNC: 107 MMOL/L (ref 99–110)
CO2 SERPL-SCNC: 23 MMOL/L (ref 21–32)
CREAT SERPL-MCNC: 1.1 MG/DL (ref 0.8–1.3)
DEPRECATED RDW RBC AUTO: 14.7 % (ref 12.4–15.4)
GFR SERPLBLD CREATININE-BSD FMLA CKD-EPI: >60 ML/MIN/{1.73_M2}
GLUCOSE BLD-MCNC: 191 MG/DL (ref 70–99)
GLUCOSE SERPL-MCNC: 152 MG/DL (ref 70–99)
HCT VFR BLD AUTO: 42.1 % (ref 40.5–52.5)
HGB BLD-MCNC: 13.7 G/DL (ref 13.5–17.5)
LV EF: 43 %
LVEF MODALITY: NORMAL
MCH RBC QN AUTO: 26.6 PG (ref 26–34)
MCHC RBC AUTO-ENTMCNC: 32.6 G/DL (ref 31–36)
MCV RBC AUTO: 81.5 FL (ref 80–100)
PERFORMED ON: ABNORMAL
PLATELET # BLD AUTO: 195 K/UL (ref 135–450)
PMV BLD AUTO: 7.4 FL (ref 5–10.5)
POTASSIUM SERPL-SCNC: 4.2 MMOL/L (ref 3.5–5.1)
RBC # BLD AUTO: 5.17 M/UL (ref 4.2–5.9)
SODIUM SERPL-SCNC: 142 MMOL/L (ref 136–145)
SPECIMEN STATUS: NORMAL
WBC # BLD AUTO: 7.2 K/UL (ref 4–11)

## 2023-05-25 PROCEDURE — 93653 COMPRE EP EVAL TX SVT: CPT | Performed by: INTERNAL MEDICINE

## 2023-05-25 PROCEDURE — 6360000002 HC RX W HCPCS: Performed by: NURSE ANESTHETIST, CERTIFIED REGISTERED

## 2023-05-25 PROCEDURE — C1730 CATH, EP, 19 OR FEW ELECT: HCPCS | Performed by: INTERNAL MEDICINE

## 2023-05-25 PROCEDURE — C1894 INTRO/SHEATH, NON-LASER: HCPCS | Performed by: INTERNAL MEDICINE

## 2023-05-25 PROCEDURE — G0378 HOSPITAL OBSERVATION PER HR: HCPCS

## 2023-05-25 PROCEDURE — 3700000000 HC ANESTHESIA ATTENDED CARE

## 2023-05-25 PROCEDURE — 85027 COMPLETE CBC AUTOMATED: CPT

## 2023-05-25 PROCEDURE — 2580000003 HC RX 258: Performed by: ANESTHESIOLOGY

## 2023-05-25 PROCEDURE — 7100000001 HC PACU RECOVERY - ADDTL 15 MIN

## 2023-05-25 PROCEDURE — 2580000003 HC RX 258: Performed by: NURSE ANESTHETIST, CERTIFIED REGISTERED

## 2023-05-25 PROCEDURE — 2500000003 HC RX 250 WO HCPCS

## 2023-05-25 PROCEDURE — 3700000001 HC ADD 15 MINUTES (ANESTHESIA)

## 2023-05-25 PROCEDURE — 2500000003 HC RX 250 WO HCPCS: Performed by: NURSE ANESTHETIST, CERTIFIED REGISTERED

## 2023-05-25 PROCEDURE — 93653 COMPRE EP EVAL TX SVT: CPT

## 2023-05-25 PROCEDURE — 7100000000 HC PACU RECOVERY - FIRST 15 MIN

## 2023-05-25 PROCEDURE — 2709999900 HC NON-CHARGEABLE SUPPLY: Performed by: INTERNAL MEDICINE

## 2023-05-25 PROCEDURE — 93312 ECHO TRANSESOPHAGEAL: CPT | Performed by: INTERNAL MEDICINE

## 2023-05-25 PROCEDURE — C1731 CATH, EP, 20 OR MORE ELEC: HCPCS | Performed by: INTERNAL MEDICINE

## 2023-05-25 PROCEDURE — C2630 CATH, EP, COOL-TIP: HCPCS | Performed by: INTERNAL MEDICINE

## 2023-05-25 PROCEDURE — 6360000002 HC RX W HCPCS

## 2023-05-25 PROCEDURE — 93005 ELECTROCARDIOGRAM TRACING: CPT | Performed by: INTERNAL MEDICINE

## 2023-05-25 PROCEDURE — 6370000000 HC RX 637 (ALT 250 FOR IP): Performed by: INTERNAL MEDICINE

## 2023-05-25 PROCEDURE — 2580000003 HC RX 258

## 2023-05-25 PROCEDURE — C1893 INTRO/SHEATH, FIXED,NON-PEEL: HCPCS | Performed by: INTERNAL MEDICINE

## 2023-05-25 PROCEDURE — 80048 BASIC METABOLIC PNL TOTAL CA: CPT

## 2023-05-25 RX ORDER — ONDANSETRON 2 MG/ML
4 INJECTION INTRAMUSCULAR; INTRAVENOUS
OUTPATIENT
Start: 2023-05-25

## 2023-05-25 RX ORDER — OXYCODONE HYDROCHLORIDE 5 MG/1
5 TABLET ORAL PRN
OUTPATIENT
Start: 2023-05-25 | End: 2023-05-25

## 2023-05-25 RX ORDER — PHENYLEPHRINE HCL IN 0.9% NACL 1 MG/10 ML
SYRINGE (ML) INTRAVENOUS PRN
Status: DISCONTINUED | OUTPATIENT
Start: 2023-05-25 | End: 2023-05-25 | Stop reason: SDUPTHER

## 2023-05-25 RX ORDER — SODIUM CHLORIDE 0.9 % (FLUSH) 0.9 %
5-40 SYRINGE (ML) INJECTION EVERY 12 HOURS SCHEDULED
OUTPATIENT
Start: 2023-05-25

## 2023-05-25 RX ORDER — LABETALOL HYDROCHLORIDE 5 MG/ML
5 INJECTION, SOLUTION INTRAVENOUS EVERY 10 MIN PRN
OUTPATIENT
Start: 2023-05-25

## 2023-05-25 RX ORDER — SODIUM CHLORIDE 9 MG/ML
INJECTION, SOLUTION INTRAVENOUS PRN
OUTPATIENT
Start: 2023-05-25

## 2023-05-25 RX ORDER — LORAZEPAM 0.5 MG/1
0.5 TABLET ORAL
Status: ACTIVE | OUTPATIENT
Start: 2023-05-25 | End: 2023-05-26

## 2023-05-25 RX ORDER — DEXAMETHASONE SODIUM PHOSPHATE 4 MG/ML
INJECTION, SOLUTION INTRA-ARTICULAR; INTRALESIONAL; INTRAMUSCULAR; INTRAVENOUS; SOFT TISSUE PRN
Status: DISCONTINUED | OUTPATIENT
Start: 2023-05-25 | End: 2023-05-25 | Stop reason: SDUPTHER

## 2023-05-25 RX ORDER — FLUTICASONE PROPIONATE 50 MCG
2 SPRAY, SUSPENSION (ML) NASAL DAILY
Status: DISCONTINUED | OUTPATIENT
Start: 2023-05-25 | End: 2023-05-26 | Stop reason: HOSPADM

## 2023-05-25 RX ORDER — PROPOFOL 10 MG/ML
INJECTION, EMULSION INTRAVENOUS PRN
Status: DISCONTINUED | OUTPATIENT
Start: 2023-05-25 | End: 2023-05-25 | Stop reason: SDUPTHER

## 2023-05-25 RX ORDER — ASPIRIN 81 MG/1
81 TABLET ORAL DAILY
Status: DISCONTINUED | OUTPATIENT
Start: 2023-05-26 | End: 2023-05-26 | Stop reason: HOSPADM

## 2023-05-25 RX ORDER — DIPHENHYDRAMINE HYDROCHLORIDE 50 MG/ML
12.5 INJECTION INTRAMUSCULAR; INTRAVENOUS
OUTPATIENT
Start: 2023-05-25 | End: 2023-05-26

## 2023-05-25 RX ORDER — OXYCODONE HYDROCHLORIDE 5 MG/1
10 TABLET ORAL PRN
OUTPATIENT
Start: 2023-05-25 | End: 2023-05-25

## 2023-05-25 RX ORDER — SODIUM CHLORIDE 9 MG/ML
INJECTION, SOLUTION INTRAVENOUS PRN
Status: DISCONTINUED | OUTPATIENT
Start: 2023-05-25 | End: 2023-05-26 | Stop reason: HOSPADM

## 2023-05-25 RX ORDER — FENTANYL CITRATE 50 UG/ML
INJECTION, SOLUTION INTRAMUSCULAR; INTRAVENOUS PRN
Status: DISCONTINUED | OUTPATIENT
Start: 2023-05-25 | End: 2023-05-25 | Stop reason: SDUPTHER

## 2023-05-25 RX ORDER — SODIUM CHLORIDE 0.9 % (FLUSH) 0.9 %
5-40 SYRINGE (ML) INJECTION EVERY 12 HOURS SCHEDULED
Status: DISCONTINUED | OUTPATIENT
Start: 2023-05-25 | End: 2023-05-26 | Stop reason: HOSPADM

## 2023-05-25 RX ORDER — ONDANSETRON 2 MG/ML
INJECTION INTRAMUSCULAR; INTRAVENOUS PRN
Status: DISCONTINUED | OUTPATIENT
Start: 2023-05-25 | End: 2023-05-25 | Stop reason: SDUPTHER

## 2023-05-25 RX ORDER — ONDANSETRON 2 MG/ML
4 INJECTION INTRAMUSCULAR; INTRAVENOUS EVERY 6 HOURS PRN
Status: DISCONTINUED | OUTPATIENT
Start: 2023-05-25 | End: 2023-05-26 | Stop reason: HOSPADM

## 2023-05-25 RX ORDER — ATORVASTATIN CALCIUM 40 MG/1
40 TABLET, FILM COATED ORAL NIGHTLY
Status: DISCONTINUED | OUTPATIENT
Start: 2023-05-25 | End: 2023-05-26 | Stop reason: HOSPADM

## 2023-05-25 RX ORDER — MEPERIDINE HYDROCHLORIDE 50 MG/ML
12.5 INJECTION INTRAMUSCULAR; INTRAVENOUS; SUBCUTANEOUS EVERY 5 MIN PRN
OUTPATIENT
Start: 2023-05-25

## 2023-05-25 RX ORDER — ACETAMINOPHEN 325 MG/1
650 TABLET ORAL EVERY 4 HOURS PRN
Status: DISCONTINUED | OUTPATIENT
Start: 2023-05-25 | End: 2023-05-26 | Stop reason: HOSPADM

## 2023-05-25 RX ORDER — SODIUM CHLORIDE 0.9 % (FLUSH) 0.9 %
5-40 SYRINGE (ML) INJECTION PRN
OUTPATIENT
Start: 2023-05-25

## 2023-05-25 RX ORDER — SODIUM CHLORIDE 0.9 % (FLUSH) 0.9 %
5-40 SYRINGE (ML) INJECTION PRN
Status: DISCONTINUED | OUTPATIENT
Start: 2023-05-25 | End: 2023-05-26 | Stop reason: HOSPADM

## 2023-05-25 RX ORDER — ROCURONIUM BROMIDE 10 MG/ML
INJECTION, SOLUTION INTRAVENOUS PRN
Status: DISCONTINUED | OUTPATIENT
Start: 2023-05-25 | End: 2023-05-25 | Stop reason: SDUPTHER

## 2023-05-25 RX ORDER — SODIUM CHLORIDE 9 MG/ML
INJECTION, SOLUTION INTRAVENOUS CONTINUOUS PRN
Status: DISCONTINUED | OUTPATIENT
Start: 2023-05-25 | End: 2023-05-26 | Stop reason: HOSPADM

## 2023-05-25 RX ORDER — LIDOCAINE HYDROCHLORIDE 20 MG/ML
INJECTION, SOLUTION EPIDURAL; INFILTRATION; INTRACAUDAL; PERINEURAL PRN
Status: DISCONTINUED | OUTPATIENT
Start: 2023-05-25 | End: 2023-05-25 | Stop reason: SDUPTHER

## 2023-05-25 RX ORDER — SODIUM CHLORIDE, SODIUM LACTATE, POTASSIUM CHLORIDE, CALCIUM CHLORIDE 600; 310; 30; 20 MG/100ML; MG/100ML; MG/100ML; MG/100ML
INJECTION, SOLUTION INTRAVENOUS CONTINUOUS
Status: DISCONTINUED | OUTPATIENT
Start: 2023-05-25 | End: 2023-05-26 | Stop reason: HOSPADM

## 2023-05-25 RX ORDER — FAMOTIDINE 10 MG/ML
20 INJECTION, SOLUTION INTRAVENOUS ONCE
Status: DISCONTINUED | OUTPATIENT
Start: 2023-05-25 | End: 2023-05-26 | Stop reason: HOSPADM

## 2023-05-25 RX ADMIN — PROPOFOL 25 MG: 10 INJECTION, EMULSION INTRAVENOUS at 10:11

## 2023-05-25 RX ADMIN — ROCURONIUM BROMIDE 10 MG: 10 SOLUTION INTRAVENOUS at 08:59

## 2023-05-25 RX ADMIN — ATORVASTATIN CALCIUM 40 MG: 40 TABLET, FILM COATED ORAL at 22:06

## 2023-05-25 RX ADMIN — Medication 100 MCG: at 09:43

## 2023-05-25 RX ADMIN — ONDANSETRON 4 MG: 2 INJECTION INTRAMUSCULAR; INTRAVENOUS at 08:42

## 2023-05-25 RX ADMIN — PROPOFOL 150 MG: 10 INJECTION, EMULSION INTRAVENOUS at 08:18

## 2023-05-25 RX ADMIN — METFORMIN HYDROCHLORIDE 1000 MG: 500 TABLET ORAL at 17:00

## 2023-05-25 RX ADMIN — DEXMEDETOMIDINE HYDROCHLORIDE 8 MCG: 100 INJECTION, SOLUTION INTRAVENOUS at 08:30

## 2023-05-25 RX ADMIN — FENTANYL CITRATE 25 MCG: 50 INJECTION, SOLUTION INTRAMUSCULAR; INTRAVENOUS at 10:15

## 2023-05-25 RX ADMIN — APIXABAN 5 MG: 5 TABLET, FILM COATED ORAL at 22:06

## 2023-05-25 RX ADMIN — LIDOCAINE HYDROCHLORIDE 100 MG: 20 INJECTION, SOLUTION EPIDURAL; INFILTRATION; INTRACAUDAL at 08:16

## 2023-05-25 RX ADMIN — ROCURONIUM BROMIDE 10 MG: 10 SOLUTION INTRAVENOUS at 09:35

## 2023-05-25 RX ADMIN — Medication 100 MCG: at 08:44

## 2023-05-25 RX ADMIN — DEXAMETHASONE SODIUM PHOSPHATE 12 MG: 4 INJECTION, SOLUTION INTRAMUSCULAR; INTRAVENOUS at 08:42

## 2023-05-25 RX ADMIN — DEXMEDETOMIDINE HYDROCHLORIDE 8 MCG: 100 INJECTION, SOLUTION INTRAVENOUS at 08:16

## 2023-05-25 RX ADMIN — SUGAMMADEX 100 MG: 100 INJECTION, SOLUTION INTRAVENOUS at 10:16

## 2023-05-25 RX ADMIN — Medication 100 MCG: at 09:01

## 2023-05-25 RX ADMIN — PROPOFOL 25 MG: 10 INJECTION, EMULSION INTRAVENOUS at 10:14

## 2023-05-25 RX ADMIN — Medication 10 ML: at 15:56

## 2023-05-25 RX ADMIN — SUGAMMADEX 100 MG: 100 INJECTION, SOLUTION INTRAVENOUS at 10:19

## 2023-05-25 RX ADMIN — Medication 100 MCG: at 09:10

## 2023-05-25 RX ADMIN — Medication 100 MCG: at 08:47

## 2023-05-25 RX ADMIN — PROPOFOL 100 MG: 10 INJECTION, EMULSION INTRAVENOUS at 08:27

## 2023-05-25 RX ADMIN — SODIUM CHLORIDE 1000 ML: 9 INJECTION, SOLUTION INTRAVENOUS at 09:01

## 2023-05-25 RX ADMIN — ROCURONIUM BROMIDE 50 MG: 10 SOLUTION INTRAVENOUS at 08:25

## 2023-05-25 RX ADMIN — SODIUM CHLORIDE: 9 INJECTION, SOLUTION INTRAVENOUS at 08:03

## 2023-05-25 ASSESSMENT — PAIN SCALES - GENERAL
PAINLEVEL_OUTOF10: 0

## 2023-05-25 NOTE — PROGRESS NOTES
Pt to bay 3, vss, awake and talking, no co pain. R groin site soft to touch and no bleeding.   Will continue to monitor

## 2023-05-25 NOTE — PROCEDURES
Electrophysiology Laboratory       Procedure:     1) Comprehensive electrophysiology study  2) Arrhythmia induction  3) Drug infusion  4) Catheter mapping  5) 3D electro-anatomical mapping  6) Ablation of typical atrial flutter      : Neelima Topete MD    Complications: none    Estimated blood loss: Minimal    Anesthesia: general anesthesia    Medications used for induction/testing: isoproterenol     Other medications: None    History: 68 y.o. male  with history of symptomatic atrial flutter presenting for EP study and attempted atrial flutter ablation. Preoperative Diagnosis: atrial flutter    Postoperative Diagnosis: typical atrial flutter      Procedure in detail:  The patient was brought to the electrophysiology laboratory in stable condition. He was in a fasting, non-sedated state. The risks, benefits and alternatives of the procedure were discussed with the patient in details. The risks including, but not limited to, vascular injury, bleeding, infection, radiation exposure, injury to cardiac and surrounding structures, injury to the normal conduction system of the heart, stroke, myocardial infarction and death were all discussed. The patient considered the various treatment options and decided to proceed with the EP study and attempted ablation procedure. Written informed consent was obtained and placed on the chart. A time-out protocol was completed to confirm the identity of the patient and the procedure to be performed. At this point, a timeout protocol was completed to identify the patient and the procedure being performed. The patient underwent the STEVE portion of the procedure, which is reported separately. Briefly, the patient had the STVEE probe advanced into position without difficulty. He tolerated the procedure well.  Multiplane imaging of the cardiac chambers were obtained and showed no evidence of intracardiac thrombus or any other contraindication to proceed with the ablation

## 2023-05-25 NOTE — PLAN OF CARE
Problem: Cardiovascular - Adult  Goal: Absence of cardiac dysrhythmias or at baseline  Outcome: Progressing  Note: Pt post Atrial Flutter ablation, currently NSR on tele.

## 2023-05-25 NOTE — H&P
I have reviewed seen, interviewed and examined the patient. There has been no change in the findings since our last office visit dated 5/10/2023.

## 2023-05-25 NOTE — ANESTHESIA PRE PROCEDURE
Department of Anesthesiology  Preprocedure Note       Name:  Yin Granador   Age:  68 y.o.  :  1945                                          MRN:  [id number]         Date:  2023      Surgeon: * No surgeons listed *    Procedure: * No procedures listed *    Medications prior to admission:   Prior to Admission medications    Medication Sig Start Date End Date Taking? Authorizing Provider   empagliflozin (JARDIANCE) 25 MG tablet Take 0.5 tablets by mouth daily   Yes Historical Provider, MD   ELIQUIS 5 MG TABS tablet Take 1 tablet by mouth in the morning and at bedtime Resume med on 2023   Luisa Li MD   dilTIAZem [facility]) 120 MG extended release capsule Take 1 capsule by mouth daily 3/13/23   Historical Provider, MD   aspirin 81 MG EC tablet Take 1 tablet by mouth daily 21   [place] Or YukicheZULEYKA prado CNP   atorvastatin (LIPITOR) 40 MG tablet Take 1 tablet by mouth nightly 11/15/21   [place] Or ZULEYKA Kendrick CNP   fluticasone (FLONASE) 50 MCG/ACT nasal spray 2 sprays by Each Nostril route daily 11/15/21   [place] Or ZULEYKA Kendrick CNP   glimepiride (AMARYL) 4 MG tablet Take 0.5 tablets by mouth every morning (before breakfast)    Historical Provider, MD   metFORMIN (GLUCOPHAGE) 1000 MG tablet Take 1 tablet by mouth 2 times daily (with meals)    Historical Provider, MD   triamcinolone (KENALOG) 0.1 % cream Apply topically 2 times daily Apply topically 2 times daily.  16   ZULEYKA Schwartz CNP       Current medications:    Current Facility-Administered Medications   Medication Dose Route Frequency Provider Last Rate Last Admin    sodium chloride flush 0.9 % injection 5-40 mL  5-40 mL IntraVENous 2 times per day Alejandro Sweeney MD        sodium chloride flush 0.9 % injection 5-40 mL  5-40 mL IntraVENous PRN Alejandro Sweeney MD        0.9 % sodium chloride infusion   IntraVENous PRN Alejandro Sweeney MD        ondansetron [facility]) injection 4 mg  4 mg IntraVENous Q6H PRN [facility]

## 2023-05-25 NOTE — TELEPHONE ENCOUNTER
----- Message from Trang Newman MD sent at 5/25/2023  8:51 AM EDT -----  Lets remind pt, rec: f/u w/ pcp for lung nodule noted on prior ct ca score scan and also rec: referral to pulmonary, lets make sure he has done both of those and if needs referral to pulm, lets put it through. Thank you.

## 2023-05-25 NOTE — PROGRESS NOTES
4 Eyes Skin Assessment     The patient is being assess for   Admission    I agree that 2 RN's have performed a thorough Head to Toe Skin Assessment on the patient. ALL assessment sites listed below have been assessed. Areas assessed for pressure by both nurses:   [x]   Head, Face, and Ears   [x]   Shoulders, Back, and Chest, Abdomen  [x]   Arms, Elbows, and Hands   [x]   Coccyx, Sacrum, and Ischium  [x]   Legs, Feet, and Heels        Skin Assessed Under all Medical Devices by both nurses:  NA               All Mepilex Borders were peeled back and area peeked at by both nurses:  No: NA  Please list where Mepilex Borders are located:  NA             **SHARE this note so that the co-signing nurse is able to place an eSignature**    Co-signer eSignature: Electronically signed by Kiana Bey RN on 5/25/23 at 8:08 PM EDT    Does the Patient have Skin Breakdown related to pressure?   No     (Insert Photo here)         Gerald Prevention initiated:  NA   Wound Care Orders initiated:  NA      C nurse consulted for Pressure Injury (Stage 3,4, Unstageable, DTI, NWPT, Complex wounds)and New or Established Ostomies:  NA      Primary Nurse eSignature: Electronically signed by Myra Scales RN on 5/25/23 at 7:49 PM EDT

## 2023-05-25 NOTE — ANESTHESIA POSTPROCEDURE EVALUATION
Department of Anesthesiology  Postprocedure Note    Patient: Sruthi Dominguez  MRN: [id number]  YOB: 1945  Date of evaluation: 5/25/2023      Procedure Summary     Date: 05/25/23 Room / Location: [facility] Cardiac Cath Lab    Anesthesia Start: 0803 Anesthesia Stop: 6502    Procedure: STEVE WITH ABLATION Diagnosis:       Typical atrial flutter      Status post ablation of atrial flutter      Typical atrial flutter (HCC)      Typical atrial flutter    Scheduled Providers:  Responsible Provider: Edward Bucio MD    Anesthesia Type: general ASA Status: 3          Anesthesia Type: No value filed.     Robbie Phase I: Robbie Score: 10    Robbie Phase II:        Anesthesia Post Evaluation    Comments: Postoperative Anesthesia Note    Name:    Sruthi Dominguez  MRN:      [id number]    Patient Vitals in the past 12 hrs:  05/25/23 1408, BP:(!) 146/84, Pulse:83, SpO2:97 %  05/25/23 1315, BP:(!) 148/93, Pulse:78, SpO2:97 %  05/25/23 1225, Pulse:69, SpO2:99 %  05/25/23 1215, BP:136/87, Pulse:69, SpO2:99 %  05/25/23 1208, Pulse:69  05/25/23 1200, BP:138/77, Pulse:63  05/25/23 1135, BP:119/79, Temp:97.4 °F (36.3 °C), Temp src:Oral, Pulse:66, Resp:16, SpO2:99 %  05/25/23 1037, BP:129/68, Temp:(!) 96 °F (35.6 °C), Temp src:Temporal, Pulse:72, Resp:16, SpO2:97 %  05/25/23 0630, Height:6' 3\" (1.905 m), Weight:223 lb (101.2 kg)     LABS:    CBC  Lab Results       Component                Value               Date/Time                  WBC                      7.2                 05/25/2023 06:55 AM        HGB                      13.7                05/25/2023 06:55 AM        HCT                      42.1                05/25/2023 06:55 AM        PLT                      195                 05/25/2023 06:55 AM   RENAL  Lab Results       Component                Value               Date/Time                  NA                       142                 05/25/2023 06:55 AM        K                        4.2

## 2023-05-25 NOTE — PROGRESS NOTES
Patient admitted to room 431 from PACU. Patient oriented to room, call light, bed rails, phone, lights and bathroom. Patient instructed about the schedule of the day including: vital sign frequency, lab draws, possible tests, frequency of MD and staff rounds, including RN/MD rounding together at bedside, daily weights, and I &O's. Patient instructed about prescribed diet, how to use 8MENU, and television. Telemetry box 58 in place, patient aware of placement and reason. Suction set up in room. Bed locked, in lowest position, side rails up 2/4, call light within reach. Will continue to monitor. /79   Pulse 66   Temp 97.4 °F (36.3 °C) (Oral)   Resp 16   Ht 6' 3\" (1.905 m)   Wt 223 lb (101.2 kg)   SpO2 99%   BMI 27.87 kg/m²  on room air. R groin cath site WNL; no swelling, bleeding, drainage or hematoma. Site soft, pedal pulse +3, pt with full sensation to extremity. Pt instructed bed rest for 4 hours which is until 1430. Pt instructed to keep involved extremity immobilized.   Alverto Corrigan RN  5/25/2023

## 2023-05-26 ENCOUNTER — TELEPHONE (OUTPATIENT)
Dept: CARDIOLOGY | Age: 78
End: 2023-05-26

## 2023-05-26 VITALS
RESPIRATION RATE: 18 BRPM | WEIGHT: 224.7 LBS | HEIGHT: 75 IN | SYSTOLIC BLOOD PRESSURE: 138 MMHG | OXYGEN SATURATION: 99 % | TEMPERATURE: 97.6 F | BODY MASS INDEX: 27.94 KG/M2 | HEART RATE: 65 BPM | DIASTOLIC BLOOD PRESSURE: 79 MMHG

## 2023-05-26 DIAGNOSIS — I48.91 ATRIAL FIBRILLATION, UNSPECIFIED TYPE (HCC): ICD-10-CM

## 2023-05-26 LAB
ANION GAP SERPL CALCULATED.3IONS-SCNC: 13 MMOL/L (ref 3–16)
BUN SERPL-MCNC: 30 MG/DL (ref 7–20)
CALCIUM SERPL-MCNC: 8.6 MG/DL (ref 8.3–10.6)
CHLORIDE SERPL-SCNC: 108 MMOL/L (ref 99–110)
CO2 SERPL-SCNC: 19 MMOL/L (ref 21–32)
CREAT SERPL-MCNC: 1 MG/DL (ref 0.8–1.3)
DEPRECATED RDW RBC AUTO: 14.5 % (ref 12.4–15.4)
EKG ATRIAL RATE: 62 BPM
EKG ATRIAL RATE: 69 BPM
EKG ATRIAL RATE: 85 BPM
EKG DIAGNOSIS: NORMAL
EKG P AXIS: 61 DEGREES
EKG P AXIS: 67 DEGREES
EKG P AXIS: 72 DEGREES
EKG P-R INTERVAL: 220 MS
EKG P-R INTERVAL: 226 MS
EKG P-R INTERVAL: 234 MS
EKG Q-T INTERVAL: 414 MS
EKG Q-T INTERVAL: 418 MS
EKG Q-T INTERVAL: 420 MS
EKG QRS DURATION: 100 MS
EKG QRS DURATION: 94 MS
EKG QRS DURATION: 98 MS
EKG QTC CALCULATION (BAZETT): 426 MS
EKG QTC CALCULATION (BAZETT): 447 MS
EKG QTC CALCULATION (BAZETT): 492 MS
EKG R AXIS: -10 DEGREES
EKG R AXIS: -33 DEGREES
EKG R AXIS: -36 DEGREES
EKG T AXIS: 14 DEGREES
EKG T AXIS: 15 DEGREES
EKG T AXIS: 64 DEGREES
EKG VENTRICULAR RATE: 62 BPM
EKG VENTRICULAR RATE: 69 BPM
EKG VENTRICULAR RATE: 85 BPM
GFR SERPLBLD CREATININE-BSD FMLA CKD-EPI: >60 ML/MIN/{1.73_M2}
GLUCOSE SERPL-MCNC: 156 MG/DL (ref 70–99)
HCT VFR BLD AUTO: 38.5 % (ref 40.5–52.5)
HGB BLD-MCNC: 12.2 G/DL (ref 13.5–17.5)
MCH RBC QN AUTO: 26.2 PG (ref 26–34)
MCHC RBC AUTO-ENTMCNC: 31.7 G/DL (ref 31–36)
MCV RBC AUTO: 82.7 FL (ref 80–100)
PLATELET # BLD AUTO: 198 K/UL (ref 135–450)
PMV BLD AUTO: 7.9 FL (ref 5–10.5)
POTASSIUM SERPL-SCNC: 4.6 MMOL/L (ref 3.5–5.1)
RBC # BLD AUTO: 4.66 M/UL (ref 4.2–5.9)
SODIUM SERPL-SCNC: 140 MMOL/L (ref 136–145)
WBC # BLD AUTO: 12.9 K/UL (ref 4–11)

## 2023-05-26 PROCEDURE — 85027 COMPLETE CBC AUTOMATED: CPT

## 2023-05-26 PROCEDURE — 80048 BASIC METABOLIC PNL TOTAL CA: CPT

## 2023-05-26 PROCEDURE — 36415 COLL VENOUS BLD VENIPUNCTURE: CPT

## 2023-05-26 PROCEDURE — 93010 ELECTROCARDIOGRAM REPORT: CPT | Performed by: INTERNAL MEDICINE

## 2023-05-26 PROCEDURE — G0378 HOSPITAL OBSERVATION PER HR: HCPCS

## 2023-05-26 PROCEDURE — 93228 REMOTE 30 DAY ECG REV/REPORT: CPT | Performed by: INTERNAL MEDICINE

## 2023-05-26 PROCEDURE — 99238 HOSP IP/OBS DSCHRG MGMT 30/<: CPT

## 2023-05-26 PROCEDURE — 93005 ELECTROCARDIOGRAM TRACING: CPT | Performed by: INTERNAL MEDICINE

## 2023-05-26 PROCEDURE — 6370000000 HC RX 637 (ALT 250 FOR IP): Performed by: INTERNAL MEDICINE

## 2023-05-26 RX ADMIN — METFORMIN HYDROCHLORIDE 1000 MG: 500 TABLET ORAL at 08:16

## 2023-05-26 RX ADMIN — ASPIRIN 81 MG: 81 TABLET, COATED ORAL at 08:16

## 2023-05-26 RX ADMIN — APIXABAN 5 MG: 5 TABLET, FILM COATED ORAL at 08:16

## 2023-05-26 ASSESSMENT — PAIN SCALES - GENERAL: PAINLEVEL_OUTOF10: 0

## 2023-05-26 NOTE — DISCHARGE SUMMARY
Patient ID:  Anjali Hendrix  [id number]  53 y.o.  1945    Admit date: 5/25/2023    Discharge date and time: 5/26/2023    Admitting Physician: Rodriguez Leyva MD     Discharge NP: YANETH Carrizales    Admission Diagnoses: Typical atrial flutter [I48.3]  Status post ablation of atrial flutter [Z98.890, Z86.79]  Typical atrial flutter [facility]) [I48.3]    Discharge Diagnoses: SAME    Admission Condition: fair    Discharged Condition: good    Hospital Course: Anjali Hendrix was admitted on 5/25/2023 and had an EPS with ablation of typical atrial flutter. Rhythm has been SR with rates in the 70's-80's, occasional PAC's with very brief PAT overnight. He is seen walking in the hallway. He has no complaints. He denies chest pain, palpitations, shortness of breath, and dizziness. He has eaten, ambulated, and voided.      Consults: none    Assessment:   Typical trial flutter:    -[id number] score: 4 (age, HTN, DM)    -s/p EPS with bidirectional cavotricuspid isthmus block for typical atrial flutter 5/25/2023  Moderate CAD  HTN: controlled   HLD  DM      Plan:   Stop Cardizem due to long HV interval seen during procedure   Continue Eliquis, aspirin, and atorvastatin   Continue other current medications    Post procedure instructions reviewed  EP office to arrange follow up in 6 weeks     Discharge Exam:  /79   Pulse 65   Temp 97.6 °F (36.4 °C)   Resp 18   Ht 6' 3\" (1.905 m)   Wt 224 lb 11.2 oz (101.9 kg)   SpO2 99%   BMI 28.09 kg/m²     General Appearance:    Alert, cooperative, no distress, appears stated age   Head:    Normocephalic, without obvious abnormality, atraumatic   Eyes:    PERRL, conjunctiva/corneas clear, EOM's intact        Ears:    deferred   Nose:   Nares normal, septum midline, mucosa normal, no drainage    or sinus tenderness   Throat:   Lips, mucosa, and tongue normal; teeth and gums normal   Neck:   Supple, symmetrical, trachea midline, no adenopathy;        thyroid:  No

## 2023-05-26 NOTE — CARE COORDINATION
Case Management Assessment  Initial Evaluation    Date/Time of Evaluation: 5/26/2023 9:51 AM  Assessment Completed by: Bebeto Singh RN    If patient is discharged prior to next notation, then this note serves as note for discharge by case management. Patient Name: Rolan Natarajan                   YOB: 1945  Diagnosis: Typical atrial flutter [I48.3]  Status post ablation of atrial flutter [Z98.890, Z86.79]  Typical atrial flutter ([address] [I48.3]                   Date / Time: 5/25/2023  6:15 AM    Patient Admission Status: Observation   Readmission Risk (Low < 19, Mod (19-27), High > 27): No data recorded  Current PCP: Salty Catalan MD  PCP verified by ? Yes    Chart Reviewed: Yes      History Provided by: Patient  Patient Orientation: Alert and Oriented    Patient Cognition: Alert    Hospitalization in the last 30 days (Readmission):  No    If yes, Readmission Assessment in CM Navigator will be completed.     Advance Directives:      Code Status: Full Code   Patient's Primary Decision Maker is: (P) Legal Next of Kin (Son listed as POA however patient states wife is primary decision maker)    Primary Decision MakerBsebastian Manrique - Spouse - [phone]    Secondary Decision Maker: Margarette Black - Child - [phone]    Discharge Planning:    Patient lives with: (P) Spouse/Significant Other Type of Home: (P) Apartment  Primary Care Giver: Self  Patient Support Systems include: Spouse/Significant Other, Children   Current Financial resources: (P) Medicare  Current community resources:    Current services prior to admission: (P) Durable Medical Equipment            Current DME: (P) Cane            Type of Home Care services:  (P) None    ADLS  Prior functional level: (P) Independent in ADLs/IADLs  Current functional level: (P) Independent in ADLs/IADLs    PT AM-PAC:   /24  OT AM-PAC:   /24    Family can provide assistance at DC: (P)  (no home care needed IPTA)  Would you like Case Management to

## 2023-05-26 NOTE — TELEPHONE ENCOUNTER
Per Dr. Steve Durand the patient needs an approximately 6 week HSFU s/p ablation with him. Thank You.

## 2023-05-26 NOTE — DISCHARGE INSTRUCTIONS
FOLLOW-UP APPOINTMENTS    [facility] OFFICE - Appointment on July 31st at 12:15pm with Neelima Topete MD, electrophysiologist, [facility]. [facility],  [facility], [address] [place] [address] [address], [address] [facility], [address] [place] [address]. Office #: [phone]. If you are unable to make this appointment, please call to reschedule. Directions to [place] [address] towards [place]. [address] [facility] exit. Right off exit. Cross over [employer]. Right on [address] Left into hospital. Follow the signs to the emergency room ( turn left toward the Emergency room). Go right at the first stop sign. Just past the Emergency room at the second stop sign turn right and go up the ramp and park on the top level if possible. Go in the glass doors of the [facility] we on the top level of the garage [address]. As soon as you get in the door turn left and our office is the one with the glass doors.

## 2023-05-26 NOTE — PLAN OF CARE
Problem: Chronic Conditions and Co-morbidities  Goal: Patient's chronic conditions and co-morbidity symptoms are monitored and maintained or improved  Outcome: Progressing     Problem: Discharge Planning  Goal: Discharge to home or other facility with appropriate resources  Outcome: Progressing     Problem: Pain  Goal: Verbalizes/displays adequate comfort level or baseline comfort level  Outcome: Progressing     Problem: Cardiovascular - Adult  Goal: Maintains optimal cardiac output and hemodynamic stability  Outcome: Progressing  Goal: Absence of cardiac dysrhythmias or at baseline  5/26/2023 0113 by Ada Campo RN  Outcome: Progressing  5/25/2023 1631 by Matias Gloria RN  Outcome: Progressing  Note: Pt post Atrial Flutter ablation, currently NSR on tele.

## 2023-05-30 ENCOUNTER — TELEPHONE (OUTPATIENT)
Dept: CARDIOLOGY CLINIC | Age: 78
End: 2023-05-30

## 2023-05-30 NOTE — TELEPHONE ENCOUNTER
Pt called and stated that wife was advised by ROGE that Pt should have a calcium test after procedure. Please contact Pt and advise what this test is.

## 2023-05-31 NOTE — TELEPHONE ENCOUNTER
Inocencia Lozano MD  You 10 minutes ago (9:58 AM)     [facility]  No need for coronary-related testing at this time given recent coronary angiogram without need for intervention.

## 2023-06-02 ENCOUNTER — TELEPHONE (OUTPATIENT)
Dept: PULMONOLOGY | Age: 78
End: 2023-06-02

## 2023-06-02 ENCOUNTER — OFFICE VISIT (OUTPATIENT)
Dept: PULMONOLOGY | Age: 78
End: 2023-06-02
Payer: COMMERCIAL

## 2023-06-02 VITALS
DIASTOLIC BLOOD PRESSURE: 60 MMHG | HEART RATE: 69 BPM | BODY MASS INDEX: 27.98 KG/M2 | HEIGHT: 75 IN | RESPIRATION RATE: 16 BRPM | SYSTOLIC BLOOD PRESSURE: 115 MMHG | OXYGEN SATURATION: 96 % | WEIGHT: 225 LBS

## 2023-06-02 DIAGNOSIS — R06.81 WITNESSED APNEIC SPELLS: Primary | ICD-10-CM

## 2023-06-02 DIAGNOSIS — R53.82 CHRONIC FATIGUE: ICD-10-CM

## 2023-06-02 DIAGNOSIS — R06.83 SNORING: ICD-10-CM

## 2023-06-02 DIAGNOSIS — G47.19 EXCESSIVE DAYTIME SLEEPINESS: ICD-10-CM

## 2023-06-02 DIAGNOSIS — R91.1 PULMONARY NODULE, RIGHT: ICD-10-CM

## 2023-06-02 PROBLEM — Z86.79 STATUS POST ABLATION OF ATRIAL FLUTTER: Chronic | Status: ACTIVE | Noted: 2023-05-25

## 2023-06-02 PROBLEM — Z98.890 STATUS POST ABLATION OF ATRIAL FLUTTER: Chronic | Status: ACTIVE | Noted: 2023-05-25

## 2023-06-02 PROBLEM — I63.9 ACUTE CVA (CEREBROVASCULAR ACCIDENT) (HCC): Status: RESOLVED | Noted: 2021-11-13 | Resolved: 2023-06-02

## 2023-06-02 PROCEDURE — 99204 OFFICE O/P NEW MOD 45 MIN: CPT

## 2023-06-02 PROCEDURE — 3078F DIAST BP <80 MM HG: CPT

## 2023-06-02 PROCEDURE — 3074F SYST BP LT 130 MM HG: CPT

## 2023-06-02 PROCEDURE — 1123F ACP DISCUSS/DSCN MKR DOCD: CPT

## 2023-06-02 RX ORDER — LISINOPRIL 10 MG/1
10 TABLET ORAL
COMMUNITY
Start: 2023-05-09

## 2023-06-02 ASSESSMENT — SLEEP AND FATIGUE QUESTIONNAIRES
HOW LIKELY ARE YOU TO NOD OFF OR FALL ASLEEP WHILE WATCHING TV: 1
NECK CIRCUMFERENCE (INCHES): 16
HOW LIKELY ARE YOU TO NOD OFF OR FALL ASLEEP WHILE SITTING AND TALKING TO SOMEONE: 0
HOW LIKELY ARE YOU TO NOD OFF OR FALL ASLEEP WHILE SITTING AND READING: 0
HOW LIKELY ARE YOU TO NOD OFF OR FALL ASLEEP WHILE SITTING QUIETLY AFTER LUNCH WITHOUT ALCOHOL: 0
HOW LIKELY ARE YOU TO NOD OFF OR FALL ASLEEP WHILE SITTING INACTIVE IN A PUBLIC PLACE: 0
HOW LIKELY ARE YOU TO NOD OFF OR FALL ASLEEP WHILE LYING DOWN TO REST IN THE AFTERNOON WHEN CIRCUMSTANCES PERMIT: 1
ESS TOTAL SCORE: 2
HOW LIKELY ARE YOU TO NOD OFF OR FALL ASLEEP WHEN YOU ARE A PASSENGER IN A CAR FOR AN HOUR WITHOUT A BREAK: 0
HOW LIKELY ARE YOU TO NOD OFF OR FALL ASLEEP IN A CAR, WHILE STOPPED FOR A FEW MINUTES IN TRAFFIC: 0

## 2023-06-02 NOTE — PROGRESS NOTES
PULMONARY, CRITICAL CARE AND SLEEP MEDICINE   Outpatient Sleep Consult Note  CC: Snoring, Pulmonary Nodule   Referring Provider: Patient is being seen at the request of Dr. Alia Herzog for a consultation to evaluate for Obstructive Sleep Apnea and a Pulmonary Nodule . Presenting HPI 6/2/23:  69 yo male with a 10+ year history of intermittent daytime sleepiness & chronic fatigue, unknown modifying factors, associated with severe snoring & observed apneas by his wife who joins the visit today.  + gasping from sleep.  + intermittent AM headaches. No treatments tried so far & has not been evaluated for sleep apnea in the past.  Some days he feels great, but he wishes for those days to be his 'norm.'  Sleep is interrupted by RLS symptoms (feet get hot and cannot stop moving) and nocturia to restroom several x/night. Takes rare naps during the day. [place] is 2. No car wrecks/near wrecks because of the sleepiness or nodding off while driving. Weight: stable / unchanged. Drinks 1 soda caffinated beverages/day. No significant tobacco history. + suspected family history of YOBANI in his father. He also had a recent cardiac CT with an incidental finding of a 0.7 cm right Pulmonary Nodule. Patient states he has known about a nodule for several years. Denies breathing problems or other pulmonary diagnoses. reports that he quit smoking about 4 years ago. His smoking use included cigars.  He has never used smokeless tobacco.    Past Medical History:   Diagnosis Date    Cancer [facility])     prostate cancer    Hemorrhoids, external     Hepatitis     Hernia of unspecified site of abdominal cavity without mention of obstruction or gangrene     Kidney disease     Measles     Type II or unspecified type diabetes mellitus without mention of complication, not stated as uncontrolled      Past Surgical History:   Procedure Laterality Date    CARDIAC ELECTROPHYSIOLOGY MAPPING AND ABLATION  05/25/2023    CYSTOSCOPY Right

## 2023-06-02 NOTE — PATIENT INSTRUCTIONS
What's next:  Schedule a study with the sleep lab (call them at [phone] if you do not receive their call.)    Read through the sleep hygiene tips below to see what kind of changes you can start working on in the meantime, while awaiting your sleep study. We will meet after your sleep study to discuss results, options and recommendations from there. It was great to meet you both and take care [address]    ______________________________________________________________________  Never drive a car or operate a motorized vehicle while drowsy or sleepy.   ______________________________________________________________________        Sleep Hygiene. .. Important practices for better sleep:    Avoid naps. This will ensure you are sleepy at bedtime. If you have to take a nap, sleep less than 1 hour, before 3 pm.  SCHEDULE: Have a fixed bedtime and awakening time. The human body thrives on routines. Only deviate from these set sleep times about 1-2 hours on the weekends (more than this will start altering your internal clock). You will feel better keeping a regular sleep cycle and giving your body a dependable pattern, even (especially) if you are retired or not working. Use light to train your biological clock: When you get up in the morning, exposure yourself to bright lights. When preparing for bed, dim the lights and avoid exposure to screens. The blue light from electronic screens tells our brain that it is time to be awake; it inhibits melatonin production which stops our brain from helping us get to sleep. Go to bed only when sleepy; this reduces the time you are awake in bed (which can lead to frustration and negative thoughts about sleep). If you can't fall asleep within 15-30 minutes, get up and do something boring until you feel sleepy again. Sit quietly in the dark or read the warranty on your refrigerator.  Don't expose yourself to bright light during this time (especially screens),

## 2023-06-06 ENCOUNTER — TELEPHONE (OUTPATIENT)
Dept: CARDIOLOGY CLINIC | Age: 78
End: 2023-06-06

## 2023-06-06 NOTE — TELEPHONE ENCOUNTER
Called and spoke with patient. Relayed VSP instructions, he VU. He is compliant on aspirin 81 mg daily and lipitor 40 mg nightly.

## 2023-06-06 NOTE — TELEPHONE ENCOUNTER
----- Message from Juanita Chung MD sent at 6/6/2023  3:01 PM EDT -----  The patient's calcium score is mildly elevated suggesting mild plaque consistent with prior angiogram.    Continue asa and statin

## 2023-06-20 ENCOUNTER — HOSPITAL ENCOUNTER (OUTPATIENT)
Dept: SLEEP CENTER | Age: 78
Discharge: HOME | End: 2023-06-22
Payer: COMMERCIAL

## 2023-06-20 DIAGNOSIS — G47.19 EXCESSIVE DAYTIME SLEEPINESS: ICD-10-CM

## 2023-06-20 DIAGNOSIS — R06.83 SNORING: ICD-10-CM

## 2023-06-20 DIAGNOSIS — R06.81 WITNESSED APNEIC SPELLS: ICD-10-CM

## 2023-06-20 DIAGNOSIS — R53.82 CHRONIC FATIGUE: ICD-10-CM

## 2023-06-20 PROCEDURE — 95806 SLEEP STUDY UNATT&RESP EFFT: CPT

## 2023-06-22 PROBLEM — G47.19 EXCESSIVE DAYTIME SLEEPINESS: Status: ACTIVE | Noted: 2023-06-22

## 2023-06-22 PROBLEM — R53.82 CHRONIC FATIGUE: Status: ACTIVE | Noted: 2023-06-22

## 2023-06-22 PROBLEM — R06.83 SNORING: Status: ACTIVE | Noted: 2023-06-22

## 2023-06-22 PROBLEM — R06.81 WITNESSED APNEIC SPELLS: Status: ACTIVE | Noted: 2023-06-22

## 2023-07-05 ENCOUNTER — OFFICE VISIT (OUTPATIENT)
Dept: PULMONOLOGY | Age: 78
End: 2023-07-05
Payer: COMMERCIAL

## 2023-07-05 ENCOUNTER — TELEPHONE (OUTPATIENT)
Dept: PULMONOLOGY | Age: 78
End: 2023-07-05

## 2023-07-05 VITALS
OXYGEN SATURATION: 95 % | BODY MASS INDEX: 27.85 KG/M2 | WEIGHT: 224 LBS | HEART RATE: 99 BPM | SYSTOLIC BLOOD PRESSURE: 120 MMHG | HEIGHT: 75 IN | DIASTOLIC BLOOD PRESSURE: 68 MMHG

## 2023-07-05 DIAGNOSIS — G47.33 OSA (OBSTRUCTIVE SLEEP APNEA): Primary | ICD-10-CM

## 2023-07-05 DIAGNOSIS — R91.1 RIGHT LOWER LOBE PULMONARY NODULE: ICD-10-CM

## 2023-07-05 PROBLEM — R06.81 WITNESSED APNEIC SPELLS: Status: RESOLVED | Noted: 2023-06-22 | Resolved: 2023-07-05

## 2023-07-05 PROBLEM — I48.91 ATRIAL FIBRILLATION (HCC): Chronic | Status: ACTIVE | Noted: 2023-05-10

## 2023-07-05 PROBLEM — G47.19 EXCESSIVE DAYTIME SLEEPINESS: Status: RESOLVED | Noted: 2023-06-22 | Resolved: 2023-07-05

## 2023-07-05 PROBLEM — R06.83 SNORING: Status: RESOLVED | Noted: 2023-06-22 | Resolved: 2023-07-05

## 2023-07-05 PROCEDURE — 3078F DIAST BP <80 MM HG: CPT

## 2023-07-05 PROCEDURE — 3074F SYST BP LT 130 MM HG: CPT

## 2023-07-05 PROCEDURE — 1123F ACP DISCUSS/DSCN MKR DOCD: CPT

## 2023-07-05 PROCEDURE — 99213 OFFICE O/P EST LOW 20 MIN: CPT

## 2023-07-05 ASSESSMENT — SLEEP AND FATIGUE QUESTIONNAIRES
HOW LIKELY ARE YOU TO NOD OFF OR FALL ASLEEP WHILE WATCHING TV: 1
HOW LIKELY ARE YOU TO NOD OFF OR FALL ASLEEP WHILE SITTING QUIETLY AFTER LUNCH WITHOUT ALCOHOL: 0
HOW LIKELY ARE YOU TO NOD OFF OR FALL ASLEEP WHILE SITTING INACTIVE IN A PUBLIC PLACE: 0
HOW LIKELY ARE YOU TO NOD OFF OR FALL ASLEEP IN A CAR, WHILE STOPPED FOR A FEW MINUTES IN TRAFFIC: 0
HOW LIKELY ARE YOU TO NOD OFF OR FALL ASLEEP WHILE SITTING AND TALKING TO SOMEONE: 0
HOW LIKELY ARE YOU TO NOD OFF OR FALL ASLEEP WHEN YOU ARE A PASSENGER IN A CAR FOR AN HOUR WITHOUT A BREAK: 1
ESS TOTAL SCORE: 4
HOW LIKELY ARE YOU TO NOD OFF OR FALL ASLEEP WHILE SITTING AND READING: 0
NECK CIRCUMFERENCE (INCHES): 16
HOW LIKELY ARE YOU TO NOD OFF OR FALL ASLEEP WHILE LYING DOWN TO REST IN THE AFTERNOON WHEN CIRCUMSTANCES PERMIT: 2

## 2023-07-05 NOTE — PROGRESS NOTES
PULMONARY, CRITICAL CARE AND SLEEP MEDICINE   Outpatient Sleep Progress Note  CC: Snoring, Pulmonary Nodule   Referring Provider: Dr. Judy Simmons & Dr. Gricelda Gonzalez     Interval History 7/5/23:  f/u HST  -  Had HST 6/20/23 demonstrating mild YOBANI with AHI >9. ESS is: 4. He is receptive to CPAP and looks forward to starting treatment. Concerned whether nasal interface with chin strap vs full face would be most appropriate for him. Presenting HPI 6/2/23:  67 yo male with a 10+ year history of intermittent daytime sleepiness & chronic fatigue, unknown modifying factors, associated with severe snoring & observed apneas by his wife who joins the visit today.  + gasping from sleep.  + intermittent AM headaches. No treatments tried so far & has not been evaluated for sleep apnea in the past.  Some days he feels great, but he wishes for those days to be his 'norm.'  Sleep is interrupted by RLS symptoms (feet get hot and cannot stop moving) and nocturia to restroom several x/night. Takes rare naps during the day. [place] is 2. No car wrecks/near wrecks because of the sleepiness or nodding off while driving. Weight: stable / unchanged. Drinks 1 soda caffinated beverages/day. No significant tobacco history. + suspected family history of YOBANI in his father. He also had a recent cardiac CT with an incidental finding of a 0.7 cm right Pulmonary Nodule. Patient states he has known about a nodule for several years. Denies breathing problems or other pulmonary diagnoses. reports that he quit smoking about 4 years ago. His smoking use included cigars. He has never used smokeless tobacco.    PHYSICAL EXAM:  Blood pressure 120/68, pulse 99, height 6' 3\" (1.905 m), weight 224 lb (101.6 kg), SpO2 95 %.'   225# Jun 2023;   Constitutional:  No acute distress. Very pleasant. HENT:  Oropharynx is clear and moist. Mallampati class I  Eyes: Pupils equal, round, and reactive to light. No scleral icterus.    Neck: No tracheal

## 2023-07-05 NOTE — PATIENT INSTRUCTIONS
Healthcare 60 [phone]    1-800 CPAP ([facility]) [phone]  [facility]  [phone]    [address] [facility] [phone]    [address]    Rachel Virgen [phone]                 IC[facility] ([facility]) [phone]             [address] [place] [address] [address]         (no PAP equipment)  [phone] [address] [place] [id number]

## 2023-07-24 ENCOUNTER — TELEPHONE (OUTPATIENT)
Dept: PULMONOLOGY | Age: 78
End: 2023-07-24

## 2023-07-24 DIAGNOSIS — G47.33 OSA (OBSTRUCTIVE SLEEP APNEA): Primary | ICD-10-CM

## 2023-07-24 NOTE — TELEPHONE ENCOUNTER
Pt called stating that when his CPAP pressure builds up it starts to blow so much that its blowing the mask off and causing a leak, which is affecting compliance. Pt states that he has had the machine for 10 days and he has tried different masks-F30i and P30i masks both do the same. Pt also states \"As I get relaxed the air comes up through the back of throat into mouth, have to open my mouth to let the air out. \"    Called [facility], requested Marquis Ortiz to link our office for compliance to be pulled. OV 7/5/23:    ASSESSMENT:  YOBANI  Excessive daytime sleepiness   chronic fatigue, intermittent >10 years  RLL Pulmonary Nodule, 0.7 cm solid - stable from 2018 LDCT   [employer] s/p ablation 5/25/23  CAD   Comorbid conditions: HTN, HLD, DMT2  No significant smoking history     PLAN:   Start APAP 5-15 cmH2O. Await to hear pt's DME choice. Sleep hygiene tips discussed & provided   Patient has been advised: no driving while sleepy; weight loss recommended. Pathophysiology & complications of untreated YOBANI have been discussed. Systemic benefits of CPAP therapy have been discussed.    No follow up needed for RLL Pulmonary Nodule   F/U 31-90

## 2023-07-25 NOTE — TELEPHONE ENCOUNTER
Patient states he called the DME and they said they need [facility] name attached to request instead of the Doctor that is because he has only seen her. Please advise.

## 2023-07-26 NOTE — TELEPHONE ENCOUNTER
Patient states he needs his pressures adjusted, he states we are now connected. Please advise. He would like to sleep, it's been two weeks. Patient is calling back because he has not heard anything about his pressures being adjusted.      Patient is calling back saying gasper said they never received script to change air pressures, please fax

## 2023-07-27 NOTE — TELEPHONE ENCOUNTER
Pt called back asking if this adjustment will help with the strong amount of pressure going in his nose and coming out of his mouth. Please advise.

## 2023-07-27 NOTE — TELEPHONE ENCOUNTER
I decreased air pressures to help with the air pressures feeling so high; so yes, that is part of the goal.

## 2023-07-27 NOTE — TELEPHONE ENCOUNTER
Sorry for the delay -- upon review of his report his air pressures are staying pretty low. I have decreased them from 5-15 to 4-8 for tolerance, with increase in ramp to 45 minutes. It is common for the air pressures to feel much too high in the beginning of treatment while your body is still acclimating. This usually gets better as time goes on and the body gets more practice with use. In the meantime, the adjustments that I made should help with this.

## 2023-07-28 ASSESSMENT — ENCOUNTER SYMPTOMS
HEMATEMESIS: 0
WHEEZING: 0
RIGHT EYE: 0
SHORTNESS OF BREATH: 0
SNORING: 1
HEMATOCHEZIA: 0
STRIDOR: 0
LEFT EYE: 0

## 2023-07-28 NOTE — TELEPHONE ENCOUNTER
Spoke with pt and informed. Pt unplugged the machine while I had him on the line and turned it back on and it still reset to 5-15. Faxed order for pressure change to [facility] and advised pt to call [facility] to help resolve it in about 15 minutes. Pt verbalized understanding.

## 2023-07-28 NOTE — TELEPHONE ENCOUNTER
Checked [employer] log history where settings were sent to device so I'm not sure why they are at 5-15 now. I changed again back to APAP 4-8, with 45 minute ramp. Signing order for pressure change. His device should update with the new settings the next time it syncs.

## 2023-07-31 ENCOUNTER — OFFICE VISIT (OUTPATIENT)
Dept: CARDIOLOGY CLINIC | Age: 78
End: 2023-07-31
Payer: COMMERCIAL

## 2023-07-31 VITALS
OXYGEN SATURATION: 97 % | HEART RATE: 78 BPM | HEIGHT: 75 IN | DIASTOLIC BLOOD PRESSURE: 60 MMHG | SYSTOLIC BLOOD PRESSURE: 124 MMHG | BODY MASS INDEX: 27.88 KG/M2 | WEIGHT: 224.2 LBS

## 2023-07-31 DIAGNOSIS — G47.33 OSA (OBSTRUCTIVE SLEEP APNEA): ICD-10-CM

## 2023-07-31 DIAGNOSIS — I49.3 PVC (PREMATURE VENTRICULAR CONTRACTION): ICD-10-CM

## 2023-07-31 DIAGNOSIS — I10 PRIMARY HYPERTENSION: ICD-10-CM

## 2023-07-31 DIAGNOSIS — I48.0 PAROXYSMAL ATRIAL FIBRILLATION (HCC): Chronic | ICD-10-CM

## 2023-07-31 DIAGNOSIS — I48.3 TYPICAL ATRIAL FLUTTER (HCC): Primary | ICD-10-CM

## 2023-07-31 PROCEDURE — 1123F ACP DISCUSS/DSCN MKR DOCD: CPT | Performed by: INTERNAL MEDICINE

## 2023-07-31 PROCEDURE — 99214 OFFICE O/P EST MOD 30 MIN: CPT | Performed by: INTERNAL MEDICINE

## 2023-07-31 PROCEDURE — 93000 ELECTROCARDIOGRAM COMPLETE: CPT | Performed by: INTERNAL MEDICINE

## 2023-07-31 PROCEDURE — 3074F SYST BP LT 130 MM HG: CPT | Performed by: INTERNAL MEDICINE

## 2023-07-31 PROCEDURE — 3078F DIAST BP <80 MM HG: CPT | Performed by: INTERNAL MEDICINE

## 2023-07-31 ASSESSMENT — ENCOUNTER SYMPTOMS: SLEEP DISTURBANCES DUE TO BREATHING: 1

## 2023-09-07 ENCOUNTER — OFFICE VISIT (OUTPATIENT)
Dept: PULMONOLOGY | Age: 78
End: 2023-09-07

## 2023-09-07 VITALS
DIASTOLIC BLOOD PRESSURE: 58 MMHG | BODY MASS INDEX: 28 KG/M2 | WEIGHT: 225.2 LBS | HEART RATE: 73 BPM | SYSTOLIC BLOOD PRESSURE: 120 MMHG | HEIGHT: 75 IN | OXYGEN SATURATION: 97 % | RESPIRATION RATE: 16 BRPM

## 2023-09-07 DIAGNOSIS — G47.33 OSA (OBSTRUCTIVE SLEEP APNEA): Primary | ICD-10-CM

## 2023-09-07 RX ORDER — LANCETS
EACH MISCELLANEOUS
COMMUNITY
Start: 2016-10-26

## 2023-09-07 RX ORDER — BLOOD SUGAR DIAGNOSTIC
STRIP MISCELLANEOUS
COMMUNITY
Start: 2016-10-26

## 2023-09-07 ASSESSMENT — SLEEP AND FATIGUE QUESTIONNAIRES
ESS TOTAL SCORE: 0
HOW LIKELY ARE YOU TO NOD OFF OR FALL ASLEEP WHILE SITTING INACTIVE IN A PUBLIC PLACE: 0
HOW LIKELY ARE YOU TO NOD OFF OR FALL ASLEEP WHILE LYING DOWN TO REST IN THE AFTERNOON WHEN CIRCUMSTANCES PERMIT: 0
HOW LIKELY ARE YOU TO NOD OFF OR FALL ASLEEP IN A CAR, WHILE STOPPED FOR A FEW MINUTES IN TRAFFIC: 0
HOW LIKELY ARE YOU TO NOD OFF OR FALL ASLEEP WHEN YOU ARE A PASSENGER IN A CAR FOR AN HOUR WITHOUT A BREAK: 0
HOW LIKELY ARE YOU TO NOD OFF OR FALL ASLEEP WHILE SITTING AND READING: 0
NECK CIRCUMFERENCE (INCHES): 15.5
HOW LIKELY ARE YOU TO NOD OFF OR FALL ASLEEP WHILE WATCHING TV: 0
HOW LIKELY ARE YOU TO NOD OFF OR FALL ASLEEP WHILE SITTING QUIETLY AFTER LUNCH WITHOUT ALCOHOL: 0
HOW LIKELY ARE YOU TO NOD OFF OR FALL ASLEEP WHILE SITTING AND TALKING TO SOMEONE: 0

## 2023-09-07 NOTE — PROGRESS NOTES
pericardial effusion. No cardiomegaly. No evidence of acute process in  the lungs. Peripheral mild interstitial changes in the visualized lower  lobes. There is a solid-appearing nodular density in the right lower lobe  measuring 9 x 5 mm. Limited images of the upper abdomen are grossly  unremarkable. Visualized osseous structures demonstrate age related  degenerative changes without acute abnormality. IMPRESSION:   Mild interstitial changes in the visualized lungs. 7 mm right solid pulmonary nodule. Recommend a non-contrast Chest CT at 6-12  months. If patient is high risk for malignancy, recommend an additional  non-contrast Chest CT at 18-24 months; if patient is low risk for malignancy  a non-contrast Chest CT at 18-24 months is optional.    ADDENDUM:  Specific request to compare this limited CT to a prior low-dose CT  01/04/2018. Current evaluation of right lower lobe pulmonary nodule is  limited, performed in the axial plane only for purposes of cardiac calcium  scoring. In retrospect, there is a nodule present in 2018, with relative  stability allowing for differences in technique and limitations of current  exam.  Given stability, this is likely a chronic benign finding. HST 6/20/23:  AHI 9.4  SpO2 marisel 86% with 2.6 min <90%    ASSESSMENT:  YOBANI - doing great on CPAP   chronic fatigue, intermittent >10 years - improved on CPAP   RLL Pulmonary Nodule, 0.7 cm solid - stable from 2018 LDCT   Aflutter s/p ablation 5/25/23  CAD   Comorbid conditions: HTN, HLD, DMT2  No significant smoking history    PLAN:   APAP 4-8 cmH2O. Airsense 11 set up 7/13/23. Supplies - [facility]. Consider CPAP mask liners  Sleep hygiene tips discussed & provided along with supply schedule  Patient has been advised: no driving while sleepy; weight loss recommended. Pathophysiology & complications of untreated YOBANI have been discussed. Systemic benefits of CPAP therapy have been discussed.    No follow up needed for RLL

## 2023-09-07 NOTE — PATIENT INSTRUCTIONS
Great to see you both again and take care [address] [place] [address] Road      Never drive a car or operate a motorized vehicle while drowsy or sleepy. Sleep Hygiene. .. Important practices for better sleep:    Avoid naps. This will ensure you are sleepy at bedtime. If you have to take a nap, sleep less than 1 hour, before 3 pm.  SCHEDULE: Have a fixed bedtime and awakening time. The human body thrives on routines. Only deviate from these set sleep times about 1-2 hours on the weekends (more than this will start altering your internal clock). You will feel better keeping a regular sleep cycle and giving your body a dependable pattern, even (especially) if you are retired or not working. Use light to train your biological clock: When you get up in the morning, exposure yourself to bright lights. When preparing for bed, dim the lights and avoid exposure to screens. The blue light from electronic screens tells our brain that it is time to be awake; it inhibits melatonin production which stops our brain from helping us get to sleep. Go to bed only when sleepy; this reduces the time you are awake in bed (which can lead to frustration and negative thoughts about sleep). If you can't fall asleep within 15-30 minutes, get up and do something boring until you feel sleepy again. Sit quietly in the dark or read the warranty on your refrigerator. Don't expose yourself to bright light during this time (especially screens), which would cue your brain that it is time to wake up. Regular exercise is recommended to help you deepen your sleep (and MANY other reasons), but timing is important--aim to exercise in the morning or early afternoon, not within 4-6 hours of your bedtime. Only use your bed for sleeping & intimacy. Do not use your bed as an office, workroom or recreation room. Let your mind/body \"know\" that the bed is associated with sleeping. Develop sleep rituals. Give your body clues it is time to slow down and sleep.

## 2023-10-23 ENCOUNTER — TELEPHONE (OUTPATIENT)
Dept: CARDIOLOGY CLINIC | Age: 78
End: 2023-10-23

## 2023-10-23 ENCOUNTER — TELEPHONE (OUTPATIENT)
Dept: PULMONOLOGY | Age: 78
End: 2023-10-23

## 2023-10-23 NOTE — TELEPHONE ENCOUNTER
Instead of taking such a big jump, I changed from APAP 4-8 to APAP 9-11 (& increased starting ramp pressure to 5). Give this a try and make sure we are moving in the right direction, if pt feels it still needs to be increased further then we can certainly do so.

## 2023-10-23 NOTE — TELEPHONE ENCOUNTER
Pt would like you to up his pressure to 10 due to him feeling like he is not getting enough air when he is using his cpap machine.

## 2023-10-23 NOTE — TELEPHONE ENCOUNTER
----- Message from Nikole Mccrary RN sent at 7/31/2023  1:26 PM EDT -----  Regarding: Cardiac event monitor  The patient will need a 2 week cardiac event monitor mailed to his home on 10/23/2023. He will place the monitor on himself on 10/31/2023. Thank you.

## 2023-10-23 NOTE — TELEPHONE ENCOUNTER
Monitor placed by BEING SENT TO [address] [place] [address]  Length of monitor 14 DAY  Monitor ordered by [address] successful prior to pt leaving office? No being sent to pt home. Spoke with pt, informed pt that I have ordered VC monitor. Pt v/u.

## 2023-12-01 ENCOUNTER — TELEPHONE (OUTPATIENT)
Dept: CARDIOLOGY CLINIC | Age: 78
End: 2023-12-01

## 2023-12-01 DIAGNOSIS — I48.3 TYPICAL ATRIAL FLUTTER (HCC): ICD-10-CM

## 2023-12-05 ASSESSMENT — ENCOUNTER SYMPTOMS
SHORTNESS OF BREATH: 0
RIGHT EYE: 0
LEFT EYE: 0
HEMATOCHEZIA: 0
STRIDOR: 0
WHEEZING: 0
HEMATEMESIS: 0

## 2023-12-05 NOTE — PROGRESS NOTES
by mouth every morning (before breakfast)      metFORMIN (GLUCOPHAGE) 1000 MG tablet Take 1 tablet by mouth 2 times daily (with meals)      triamcinolone (KENALOG) 0.1 % cream Apply topically 2 times daily Apply topically 2 times daily. 1 Tube 3     No current facility-administered medications for this visit. Lab Review     Lab Results   Component Value Date/Time     05/26/2023 04:50 AM    K 4.6 05/26/2023 04:50 AM    K 4.2 05/25/2023 06:55 AM     05/26/2023 04:50 AM    CO2 19 05/26/2023 04:50 AM    BUN 30 05/26/2023 04:50 AM    CREATININE 1.0 05/26/2023 04:50 AM    GLUCOSE 156 05/26/2023 04:50 AM    CALCIUM 8.6 05/26/2023 04:50 AM        Lab Results   Component Value Date    WBC 12.9 (H) 05/26/2023    HGB 12.2 (L) 05/26/2023    HCT 38.5 (L) 05/26/2023    MCV 82.7 05/26/2023     05/26/2023       Lab Results   Component Value Date    TSHREFLEX 1.51 12/09/2016       No results found for: \"BNP\"    I, Diamond Morales RN, am scribing for and in the presence of Dr. Bola Lay.  12/06/23 11:23 AM   Diamond Morales RN

## 2023-12-06 ENCOUNTER — OFFICE VISIT (OUTPATIENT)
Dept: CARDIOLOGY CLINIC | Age: 78
End: 2023-12-06
Payer: COMMERCIAL

## 2023-12-06 VITALS
BODY MASS INDEX: 27.85 KG/M2 | HEIGHT: 75 IN | WEIGHT: 224 LBS | OXYGEN SATURATION: 99 % | SYSTOLIC BLOOD PRESSURE: 122 MMHG | DIASTOLIC BLOOD PRESSURE: 62 MMHG | HEART RATE: 70 BPM

## 2023-12-06 DIAGNOSIS — I10 ESSENTIAL HYPERTENSION: ICD-10-CM

## 2023-12-06 DIAGNOSIS — I49.3 FREQUENT PVCS: ICD-10-CM

## 2023-12-06 DIAGNOSIS — G47.33 OSA (OBSTRUCTIVE SLEEP APNEA): ICD-10-CM

## 2023-12-06 DIAGNOSIS — I48.3 TYPICAL ATRIAL FLUTTER (HCC): Primary | ICD-10-CM

## 2023-12-06 DIAGNOSIS — I48.0 PAROXYSMAL ATRIAL FIBRILLATION (HCC): Chronic | ICD-10-CM

## 2023-12-06 PROCEDURE — 3078F DIAST BP <80 MM HG: CPT | Performed by: INTERNAL MEDICINE

## 2023-12-06 PROCEDURE — 3074F SYST BP LT 130 MM HG: CPT | Performed by: INTERNAL MEDICINE

## 2023-12-06 PROCEDURE — 1123F ACP DISCUSS/DSCN MKR DOCD: CPT | Performed by: INTERNAL MEDICINE

## 2023-12-06 PROCEDURE — 93000 ELECTROCARDIOGRAM COMPLETE: CPT | Performed by: INTERNAL MEDICINE

## 2023-12-06 PROCEDURE — 99214 OFFICE O/P EST MOD 30 MIN: CPT | Performed by: INTERNAL MEDICINE

## 2023-12-06 ASSESSMENT — ENCOUNTER SYMPTOMS
SNORING: 0
SLEEP DISTURBANCES DUE TO BREATHING: 0

## 2023-12-06 NOTE — PATIENT INSTRUCTIONS
Plan:     The current medical regimen is effective;  continue present plan and medications. Follow up with me in 12 months.

## 2024-06-10 ENCOUNTER — HOSPITAL ENCOUNTER (OUTPATIENT)
Dept: CT IMAGING | Age: 79
Discharge: HOME | End: 2024-06-10
Attending: FAMILY MEDICINE
Payer: COMMERCIAL

## 2024-06-10 DIAGNOSIS — R91.1 LUNG NODULE: ICD-10-CM

## 2024-06-10 PROCEDURE — 71250 CT THORAX DX C-: CPT

## 2024-06-13 ENCOUNTER — TELEPHONE (OUTPATIENT)
Dept: PULMONOLOGY | Age: 79
End: 2024-06-13

## 2024-06-13 DIAGNOSIS — G47.33 OSA (OBSTRUCTIVE SLEEP APNEA): Primary | Chronic | ICD-10-CM

## 2024-06-13 NOTE — TELEPHONE ENCOUNTER
Please upload CR.   I called pt & discussed.  Currently on APAP 9-11.  After change from large FFM with memory foam to to XL FFM without foam, the air pressures felt & sounded dramatically higher to he & his spouse from the start.  Woke up with severe abdominal distention.   I verified air pressures remain the same and report with no difference in pressures delivered in auto mode.  New mask is much more comfortable and pt is hoping to continue with it.    Changed to APAP 8-10.  14 day CR in 2 weeks pls. Pt to call with further concerns.

## 2024-06-13 NOTE — TELEPHONE ENCOUNTER
Pt called he received a new mask. He wore it all night and he woke up feeling full of air.  He said it was like it was on full blast. The mask he wore before does not come in XL and the large is to small. His DME recommended this mask, it is comfortable and he likes it but can't wear it due to high pressure.  Please call pt he is wanting to know what to do [phone]

## 2024-06-13 NOTE — TELEPHONE ENCOUNTER
Compliance uploaded.  Will pull a 14 day CR in 2 weeks.     Also faxed pressure change order to [facility] via [employer] to [phone].

## 2024-06-20 ENCOUNTER — TELEPHONE (OUTPATIENT)
Dept: CARDIOLOGY CLINIC | Age: 79
End: 2024-06-20

## 2024-06-20 NOTE — TELEPHONE ENCOUNTER
Emre Marte MD  [facility] Ep4 minutes ago (4:09 PM)       Patient last seen in my clinic in December 2023. At the time, he was doing well and his cardiac issues were stable. Based on that assessment, he would be of acceptable risk, from a cardiology standpoint, to undergo the planned eye procedure.    He has an elevated risk of stroke and is on oral anticoagulation. If holding such oral anticoagulation is not necessary, it should be continued. If the physician performing the procedure deems it necessary to hold oral anticoagulation, the duration of interruption should be minimized (hold apixaban 48 hours prior to procedure and resume it as soon as possible afterwards if no bleeding issues noted - preferably same day).     Letter created and faxed

## 2024-06-20 NOTE — TELEPHONE ENCOUNTER
Turner Roberson, 1945    Cardiac Risk Assessment    What type of procedure are you having?  Cataract extraction with intraocular lens placement    When is your procedure scheduled for?  N/a    Medications to be stopped.  Pls advise    What physician is performing your procedure?  Glen Ortiz PA-C    Phone Number:   [phone]    Fax number to send the letter:   [phone]    Cardiologist:   ROGE    Last Appointment:   12/06/2023    Next Appointment:   12/04/2024    Are you on any blood thinners?   eliquis

## 2024-06-22 ENCOUNTER — HOSPITAL ENCOUNTER (INPATIENT)
Age: 79
LOS: 1 days | Discharge: HOME | DRG: 446 | End: 2024-06-23
Attending: EMERGENCY MEDICINE | Admitting: INTERNAL MEDICINE
Payer: COMMERCIAL

## 2024-06-22 ENCOUNTER — APPOINTMENT (OUTPATIENT)
Dept: CT IMAGING | Age: 79
DRG: 446 | End: 2024-06-22
Payer: COMMERCIAL

## 2024-06-22 DIAGNOSIS — K81.0 ACUTE CHOLECYSTITIS: ICD-10-CM

## 2024-06-22 DIAGNOSIS — K81.9 CHOLECYSTITIS: Primary | ICD-10-CM

## 2024-06-22 DIAGNOSIS — R11.2 NAUSEA AND VOMITING, UNSPECIFIED VOMITING TYPE: ICD-10-CM

## 2024-06-22 DIAGNOSIS — R10.10 PAIN OF UPPER ABDOMEN: ICD-10-CM

## 2024-06-22 DIAGNOSIS — I70.0 ATHEROSCLEROTIC ULCER OF AORTA (HCC): ICD-10-CM

## 2024-06-22 DIAGNOSIS — I71.9 ATHEROSCLEROTIC ULCER OF AORTA (HCC): ICD-10-CM

## 2024-06-22 LAB
ALBUMIN SERPL-MCNC: 4.3 G/DL (ref 3.4–5)
ALBUMIN/GLOB SERPL: 1.5 {RATIO} (ref 1.1–2.2)
ALP SERPL-CCNC: 81 U/L (ref 40–129)
ALT SERPL-CCNC: 16 U/L (ref 10–40)
ANION GAP SERPL CALCULATED.3IONS-SCNC: 16 MMOL/L (ref 3–16)
AST SERPL-CCNC: 16 U/L (ref 15–37)
BASOPHILS # BLD: 0.1 K/UL (ref 0–0.2)
BASOPHILS NFR BLD: 0.7 %
BILIRUB SERPL-MCNC: 0.9 MG/DL (ref 0–1)
BILIRUB UR QL STRIP.AUTO: NEGATIVE
BUN SERPL-MCNC: 26 MG/DL (ref 7–20)
CALCIUM SERPL-MCNC: 9.7 MG/DL (ref 8.3–10.6)
CHLORIDE SERPL-SCNC: 105 MMOL/L (ref 99–110)
CLARITY UR: CLEAR
CO2 SERPL-SCNC: 20 MMOL/L (ref 21–32)
COLOR UR: YELLOW
CREAT SERPL-MCNC: 1 MG/DL (ref 0.8–1.3)
DEPRECATED RDW RBC AUTO: 15.9 % (ref 12.4–15.4)
EOSINOPHIL # BLD: 0.3 K/UL (ref 0–0.6)
EOSINOPHIL NFR BLD: 2.2 %
GFR SERPLBLD CREATININE-BSD FMLA CKD-EPI: 77 ML/MIN/{1.73_M2}
GLUCOSE SERPL-MCNC: 197 MG/DL (ref 70–99)
GLUCOSE UR STRIP.AUTO-MCNC: >=1000 MG/DL
HCT VFR BLD AUTO: 44.4 % (ref 40.5–52.5)
HGB BLD-MCNC: 14.5 G/DL (ref 13.5–17.5)
HGB UR QL STRIP.AUTO: NEGATIVE
KETONES UR STRIP.AUTO-MCNC: ABNORMAL MG/DL
LEUKOCYTE ESTERASE UR QL STRIP.AUTO: NEGATIVE
LIPASE SERPL-CCNC: 35 U/L (ref 13–60)
LYMPHOCYTES # BLD: 1.4 K/UL (ref 1–5.1)
LYMPHOCYTES NFR BLD: 11.6 %
MCH RBC QN AUTO: 25.9 PG (ref 26–34)
MCHC RBC AUTO-ENTMCNC: 32.6 G/DL (ref 31–36)
MCV RBC AUTO: 79.6 FL (ref 80–100)
MONOCYTES # BLD: 0.7 K/UL (ref 0–1.3)
MONOCYTES NFR BLD: 6.4 %
NEUTROPHILS # BLD: 9.3 K/UL (ref 1.7–7.7)
NEUTROPHILS NFR BLD: 79.1 %
NITRITE UR QL STRIP.AUTO: NEGATIVE
PH UR STRIP.AUTO: 6 [PH] (ref 5–8)
PLATELET # BLD AUTO: 199 K/UL (ref 135–450)
PMV BLD AUTO: 8.2 FL (ref 5–10.5)
POTASSIUM SERPL-SCNC: 4.5 MMOL/L (ref 3.5–5.1)
PROT SERPL-MCNC: 7.2 G/DL (ref 6.4–8.2)
PROT UR STRIP.AUTO-MCNC: NEGATIVE MG/DL
RBC # BLD AUTO: 5.58 M/UL (ref 4.2–5.9)
SODIUM SERPL-SCNC: 141 MMOL/L (ref 136–145)
SP GR UR STRIP.AUTO: 1.01 (ref 1–1.03)
TROPONIN, HIGH SENSITIVITY: 19 NG/L (ref 0–22)
UA COMPLETE W REFLEX CULTURE PNL UR: ABNORMAL
UA DIPSTICK W REFLEX MICRO PNL UR: ABNORMAL
URN SPEC COLLECT METH UR: ABNORMAL
UROBILINOGEN UR STRIP-ACNC: 0.2 E.U./DL
WBC # BLD AUTO: 11.7 K/UL (ref 4–11)

## 2024-06-22 PROCEDURE — 93005 ELECTROCARDIOGRAM TRACING: CPT | Performed by: PHYSICIAN ASSISTANT

## 2024-06-22 PROCEDURE — 96365 THER/PROPH/DIAG IV INF INIT: CPT

## 2024-06-22 PROCEDURE — 6370000000 HC RX 637 (ALT 250 FOR IP)

## 2024-06-22 PROCEDURE — 83690 ASSAY OF LIPASE: CPT

## 2024-06-22 PROCEDURE — 83036 HEMOGLOBIN GLYCOSYLATED A1C: CPT

## 2024-06-22 PROCEDURE — 6360000002 HC RX W HCPCS: Performed by: PHYSICIAN ASSISTANT

## 2024-06-22 PROCEDURE — 2580000003 HC RX 258: Performed by: PHYSICIAN ASSISTANT

## 2024-06-22 PROCEDURE — 96375 TX/PRO/DX INJ NEW DRUG ADDON: CPT

## 2024-06-22 PROCEDURE — 81003 URINALYSIS AUTO W/O SCOPE: CPT

## 2024-06-22 PROCEDURE — 84484 ASSAY OF TROPONIN QUANT: CPT

## 2024-06-22 PROCEDURE — 80053 COMPREHEN METABOLIC PANEL: CPT

## 2024-06-22 PROCEDURE — 85025 COMPLETE CBC W/AUTO DIFF WBC: CPT

## 2024-06-22 PROCEDURE — 99285 EMERGENCY DEPT VISIT HI MDM: CPT

## 2024-06-22 PROCEDURE — 71275 CT ANGIOGRAPHY CHEST: CPT

## 2024-06-22 PROCEDURE — 36415 COLL VENOUS BLD VENIPUNCTURE: CPT

## 2024-06-22 PROCEDURE — 6360000004 HC RX CONTRAST MEDICATION: Performed by: PHYSICIAN ASSISTANT

## 2024-06-22 RX ORDER — 0.9 % SODIUM CHLORIDE 0.9 %
1000 INTRAVENOUS SOLUTION INTRAVENOUS ONCE
Status: COMPLETED | OUTPATIENT
Start: 2024-06-22 | End: 2024-06-22

## 2024-06-22 RX ORDER — ONDANSETRON 2 MG/ML
4 INJECTION INTRAMUSCULAR; INTRAVENOUS ONCE
Status: COMPLETED | OUTPATIENT
Start: 2024-06-22 | End: 2024-06-22

## 2024-06-22 RX ADMIN — IOPAMIDOL 85 ML: 755 INJECTION, SOLUTION INTRAVENOUS at 18:26

## 2024-06-22 RX ADMIN — HYDROMORPHONE HYDROCHLORIDE 1 MG: 1 INJECTION, SOLUTION INTRAMUSCULAR; INTRAVENOUS; SUBCUTANEOUS at 17:46

## 2024-06-22 RX ADMIN — PIPERACILLIN AND TAZOBACTAM 3375 MG: 3; .375 INJECTION, POWDER, LYOPHILIZED, FOR SOLUTION INTRAVENOUS at 22:15

## 2024-06-22 RX ADMIN — ONDANSETRON 4 MG: 2 INJECTION INTRAMUSCULAR; INTRAVENOUS at 17:46

## 2024-06-22 RX ADMIN — SODIUM CHLORIDE 1000 ML: 9 INJECTION, SOLUTION INTRAVENOUS at 22:14

## 2024-06-22 RX ADMIN — METFORMIN HYDROCHLORIDE 1000 MG: 500 TABLET ORAL at 22:51

## 2024-06-22 ASSESSMENT — PAIN DESCRIPTION - LOCATION
LOCATION: ABDOMEN
LOCATION: ABDOMEN

## 2024-06-22 ASSESSMENT — PAIN - FUNCTIONAL ASSESSMENT: PAIN_FUNCTIONAL_ASSESSMENT: 0-10

## 2024-06-22 ASSESSMENT — PAIN SCALES - GENERAL
PAINLEVEL_OUTOF10: 9
PAINLEVEL_OUTOF10: 9

## 2024-06-23 VITALS
TEMPERATURE: 97.4 F | WEIGHT: 227 LBS | DIASTOLIC BLOOD PRESSURE: 76 MMHG | RESPIRATION RATE: 18 BRPM | OXYGEN SATURATION: 98 % | HEART RATE: 57 BPM | HEIGHT: 75 IN | SYSTOLIC BLOOD PRESSURE: 139 MMHG | BODY MASS INDEX: 28.23 KG/M2

## 2024-06-23 PROBLEM — I70.0 ATHEROSCLEROTIC ULCER OF AORTA (HCC): Status: ACTIVE | Noted: 2024-06-23

## 2024-06-23 PROBLEM — K81.0 ACUTE CHOLECYSTITIS: Status: ACTIVE | Noted: 2024-06-23

## 2024-06-23 PROBLEM — I71.9 ATHEROSCLEROTIC ULCER OF AORTA (HCC): Status: ACTIVE | Noted: 2024-06-23

## 2024-06-23 LAB
ALBUMIN SERPL-MCNC: 3.7 G/DL (ref 3.4–5)
ALBUMIN/GLOB SERPL: 1.6 {RATIO} (ref 1.1–2.2)
ALP SERPL-CCNC: 70 U/L (ref 40–129)
ALT SERPL-CCNC: 14 U/L (ref 10–40)
ANION GAP SERPL CALCULATED.3IONS-SCNC: 9 MMOL/L (ref 3–16)
AST SERPL-CCNC: 13 U/L (ref 15–37)
BASOPHILS # BLD: 0 K/UL (ref 0–0.2)
BASOPHILS NFR BLD: 0.4 %
BILIRUB SERPL-MCNC: 1.1 MG/DL (ref 0–1)
BUN SERPL-MCNC: 21 MG/DL (ref 7–20)
CALCIUM SERPL-MCNC: 9.1 MG/DL (ref 8.3–10.6)
CHLORIDE SERPL-SCNC: 108 MMOL/L (ref 99–110)
CO2 SERPL-SCNC: 25 MMOL/L (ref 21–32)
CREAT SERPL-MCNC: 1 MG/DL (ref 0.8–1.3)
DEPRECATED RDW RBC AUTO: 15.2 % (ref 12.4–15.4)
EKG ATRIAL RATE: 60 BPM
EKG DIAGNOSIS: NORMAL
EKG P AXIS: -14 DEGREES
EKG P-R INTERVAL: 178 MS
EKG Q-T INTERVAL: 416 MS
EKG QRS DURATION: 96 MS
EKG QTC CALCULATION (BAZETT): 416 MS
EKG R AXIS: -40 DEGREES
EKG T AXIS: 20 DEGREES
EKG VENTRICULAR RATE: 60 BPM
EOSINOPHIL # BLD: 0.3 K/UL (ref 0–0.6)
EOSINOPHIL NFR BLD: 3 %
EST. AVERAGE GLUCOSE BLD GHB EST-MCNC: 148.5 MG/DL
GFR SERPLBLD CREATININE-BSD FMLA CKD-EPI: 77 ML/MIN/{1.73_M2}
GLUCOSE BLD-MCNC: 103 MG/DL (ref 70–99)
GLUCOSE BLD-MCNC: 174 MG/DL (ref 70–99)
GLUCOSE BLD-MCNC: 234 MG/DL (ref 70–99)
GLUCOSE SERPL-MCNC: 84 MG/DL (ref 70–99)
HBA1C MFR BLD: 6.8 %
HCT VFR BLD AUTO: 40.1 % (ref 40.5–52.5)
HGB BLD-MCNC: 13.2 G/DL (ref 13.5–17.5)
LYMPHOCYTES # BLD: 1.6 K/UL (ref 1–5.1)
LYMPHOCYTES NFR BLD: 18.8 %
MCH RBC QN AUTO: 26.6 PG (ref 26–34)
MCHC RBC AUTO-ENTMCNC: 33 G/DL (ref 31–36)
MCV RBC AUTO: 80.7 FL (ref 80–100)
MONOCYTES # BLD: 0.8 K/UL (ref 0–1.3)
MONOCYTES NFR BLD: 9.4 %
NEUTROPHILS # BLD: 5.6 K/UL (ref 1.7–7.7)
NEUTROPHILS NFR BLD: 68.4 %
PERFORMED ON: ABNORMAL
PLATELET # BLD AUTO: 167 K/UL (ref 135–450)
PMV BLD AUTO: 7.9 FL (ref 5–10.5)
POTASSIUM SERPL-SCNC: 4.2 MMOL/L (ref 3.5–5.1)
PROT SERPL-MCNC: 6 G/DL (ref 6.4–8.2)
RBC # BLD AUTO: 4.97 M/UL (ref 4.2–5.9)
SODIUM SERPL-SCNC: 142 MMOL/L (ref 136–145)
WBC # BLD AUTO: 8.3 K/UL (ref 4–11)

## 2024-06-23 PROCEDURE — 85025 COMPLETE CBC W/AUTO DIFF WBC: CPT

## 2024-06-23 PROCEDURE — 2580000003 HC RX 258: Performed by: INTERNAL MEDICINE

## 2024-06-23 PROCEDURE — 36415 COLL VENOUS BLD VENIPUNCTURE: CPT

## 2024-06-23 PROCEDURE — 99221 1ST HOSP IP/OBS SF/LOW 40: CPT | Performed by: SURGERY

## 2024-06-23 PROCEDURE — 1200000000 HC SEMI PRIVATE

## 2024-06-23 PROCEDURE — 80053 COMPREHEN METABOLIC PANEL: CPT

## 2024-06-23 PROCEDURE — 93010 ELECTROCARDIOGRAM REPORT: CPT | Performed by: INTERNAL MEDICINE

## 2024-06-23 PROCEDURE — 6370000000 HC RX 637 (ALT 250 FOR IP): Performed by: INTERNAL MEDICINE

## 2024-06-23 RX ORDER — ACETAMINOPHEN 650 MG/1
650 SUPPOSITORY RECTAL EVERY 6 HOURS PRN
Status: DISCONTINUED | OUTPATIENT
Start: 2024-06-23 | End: 2024-06-23 | Stop reason: HOSPADM

## 2024-06-23 RX ORDER — SODIUM CHLORIDE 0.9 % (FLUSH) 0.9 %
5-40 SYRINGE (ML) INJECTION EVERY 12 HOURS SCHEDULED
Status: DISCONTINUED | OUTPATIENT
Start: 2024-06-23 | End: 2024-06-23 | Stop reason: HOSPADM

## 2024-06-23 RX ORDER — ASPIRIN 81 MG/1
81 TABLET ORAL DAILY
Status: DISCONTINUED | OUTPATIENT
Start: 2024-06-23 | End: 2024-06-23 | Stop reason: HOSPADM

## 2024-06-23 RX ORDER — ACETAMINOPHEN 325 MG/1
650 TABLET ORAL EVERY 6 HOURS PRN
Status: DISCONTINUED | OUTPATIENT
Start: 2024-06-23 | End: 2024-06-23 | Stop reason: HOSPADM

## 2024-06-23 RX ORDER — CIPROFLOXACIN 500 MG/1
500 TABLET, FILM COATED ORAL 2 TIMES DAILY
Qty: 20 TABLET | Refills: 0 | Status: SHIPPED | OUTPATIENT
Start: 2024-06-23 | End: 2024-07-03

## 2024-06-23 RX ORDER — ATORVASTATIN CALCIUM 40 MG/1
40 TABLET, FILM COATED ORAL NIGHTLY
Status: DISCONTINUED | OUTPATIENT
Start: 2024-06-23 | End: 2024-06-23 | Stop reason: HOSPADM

## 2024-06-23 RX ORDER — POTASSIUM CHLORIDE 7.45 MG/ML
10 INJECTION INTRAVENOUS PRN
Status: DISCONTINUED | OUTPATIENT
Start: 2024-06-23 | End: 2024-06-23 | Stop reason: HOSPADM

## 2024-06-23 RX ORDER — POLYETHYLENE GLYCOL 3350 17 G/17G
17 POWDER, FOR SOLUTION ORAL DAILY PRN
Status: DISCONTINUED | OUTPATIENT
Start: 2024-06-23 | End: 2024-06-23 | Stop reason: HOSPADM

## 2024-06-23 RX ORDER — SODIUM CHLORIDE, SODIUM LACTATE, POTASSIUM CHLORIDE, CALCIUM CHLORIDE 600; 310; 30; 20 MG/100ML; MG/100ML; MG/100ML; MG/100ML
INJECTION, SOLUTION INTRAVENOUS CONTINUOUS
Status: DISCONTINUED | OUTPATIENT
Start: 2024-06-23 | End: 2024-06-23 | Stop reason: HOSPADM

## 2024-06-23 RX ORDER — TRAMADOL HYDROCHLORIDE 50 MG/1
50 TABLET ORAL EVERY 6 HOURS PRN
Qty: 12 TABLET | Refills: 0 | Status: SHIPPED | OUTPATIENT
Start: 2024-06-23 | End: 2024-06-26

## 2024-06-23 RX ORDER — LISINOPRIL 10 MG/1
10 TABLET ORAL NIGHTLY
Status: DISCONTINUED | OUTPATIENT
Start: 2024-06-23 | End: 2024-06-23 | Stop reason: HOSPADM

## 2024-06-23 RX ORDER — ENOXAPARIN SODIUM 100 MG/ML
30 INJECTION SUBCUTANEOUS 2 TIMES DAILY
Status: DISCONTINUED | OUTPATIENT
Start: 2024-06-23 | End: 2024-06-23

## 2024-06-23 RX ORDER — ONDANSETRON 4 MG/1
4 TABLET, ORALLY DISINTEGRATING ORAL EVERY 8 HOURS PRN
Status: DISCONTINUED | OUTPATIENT
Start: 2024-06-23 | End: 2024-06-23 | Stop reason: HOSPADM

## 2024-06-23 RX ORDER — METRONIDAZOLE 500 MG/1
500 TABLET ORAL 3 TIMES DAILY
Qty: 30 TABLET | Refills: 0 | Status: SHIPPED | OUTPATIENT
Start: 2024-06-23 | End: 2024-07-03

## 2024-06-23 RX ORDER — ONDANSETRON 2 MG/ML
4 INJECTION INTRAMUSCULAR; INTRAVENOUS EVERY 6 HOURS PRN
Status: DISCONTINUED | OUTPATIENT
Start: 2024-06-23 | End: 2024-06-23 | Stop reason: HOSPADM

## 2024-06-23 RX ORDER — POTASSIUM CHLORIDE 20 MEQ/1
40 TABLET, EXTENDED RELEASE ORAL PRN
Status: DISCONTINUED | OUTPATIENT
Start: 2024-06-23 | End: 2024-06-23 | Stop reason: HOSPADM

## 2024-06-23 RX ORDER — MAGNESIUM SULFATE IN WATER 40 MG/ML
2000 INJECTION, SOLUTION INTRAVENOUS PRN
Status: DISCONTINUED | OUTPATIENT
Start: 2024-06-23 | End: 2024-06-23 | Stop reason: HOSPADM

## 2024-06-23 RX ORDER — INSULIN LISPRO 100 [IU]/ML
0-4 INJECTION, SOLUTION INTRAVENOUS; SUBCUTANEOUS
Status: DISCONTINUED | OUTPATIENT
Start: 2024-06-23 | End: 2024-06-23 | Stop reason: HOSPADM

## 2024-06-23 RX ORDER — INSULIN LISPRO 100 [IU]/ML
0-4 INJECTION, SOLUTION INTRAVENOUS; SUBCUTANEOUS NIGHTLY
Status: DISCONTINUED | OUTPATIENT
Start: 2024-06-23 | End: 2024-06-23 | Stop reason: HOSPADM

## 2024-06-23 RX ORDER — SODIUM CHLORIDE 9 MG/ML
INJECTION, SOLUTION INTRAVENOUS PRN
Status: DISCONTINUED | OUTPATIENT
Start: 2024-06-23 | End: 2024-06-23 | Stop reason: HOSPADM

## 2024-06-23 RX ORDER — SODIUM CHLORIDE 0.9 % (FLUSH) 0.9 %
5-40 SYRINGE (ML) INJECTION PRN
Status: DISCONTINUED | OUTPATIENT
Start: 2024-06-23 | End: 2024-06-23 | Stop reason: HOSPADM

## 2024-06-23 RX ADMIN — APIXABAN 5 MG: 5 TABLET, FILM COATED ORAL at 09:16

## 2024-06-23 RX ADMIN — ASPIRIN 81 MG: 81 TABLET, COATED ORAL at 09:17

## 2024-06-23 RX ADMIN — ATORVASTATIN CALCIUM 40 MG: 40 TABLET, FILM COATED ORAL at 01:35

## 2024-06-23 RX ADMIN — LISINOPRIL 10 MG: 10 TABLET ORAL at 01:35

## 2024-06-23 RX ADMIN — SODIUM CHLORIDE, POTASSIUM CHLORIDE, SODIUM LACTATE AND CALCIUM CHLORIDE: 600; 310; 30; 20 INJECTION, SOLUTION INTRAVENOUS at 01:35

## 2024-06-23 ASSESSMENT — LIFESTYLE VARIABLES: HOW OFTEN DO YOU HAVE A DRINK CONTAINING ALCOHOL: NEVER

## 2024-06-23 NOTE — PROGRESS NOTES
/76   Pulse 57   Temp 97.4 °F (36.3 °C) (Oral)   Resp 18   Ht 1.905 m (6' 3\")   Wt 103 kg (227 lb)   SpO2 98%   BMI 28.37 kg/m²     Assessment complete. Meds passed. Pt denies needs at this time. Tolerating diet, plan Dc in a bit to make sure patient is asymptomatic        Bedside Mobility Assessment Tool (BMAT):     Assessment Level 1- Sit and Shake    1. From a semi-reclined position, ask patient to sit up and rotate to a seated position at the side of the bed. Can use the bedrail.    2. Ask patient to reach out and grab your hand and shake making sure patient reaches across his/her midline.   Pass- Patient is able to come to a seated position, maintain core strength. Maintains seated balance while reaching across midline. Move on to Assessment Level 2.     Assessment Level 2- Stretch and Point   1. With patient in seated position at the side of the bed, have patient place both feet on the floor (or stool) with knees no higher than hips.    2. Ask patient to stretch one leg and straighten the knee, then bend the ankle/flex and point the toes. If appropriate, repeat with the other leg.   Pass- Patient is able to demonstrate appropriate quad strength on intended weight bearing limb(s). Move onto Assessment Level 3.     Assessment Level 3- Stand   1. Ask patient to elevate off the bed or chair (seated to standing) using an assistive device (cane, bedrail).    2. Patient should be able to raise buttocks off be and hold for a count of five. May repeat once.   Pass- Patient maintains standing stability for at least 5 seconds, proceed to assessment level 4.    Assessment Level 4- Walk   1. Ask patient to march in place at bedside.    2. Then ask patient to advance step and return each foot. Some medical conditions may render a patient from stepping backwards, use your best clinical judgement.   Pass- Patient demonstrates balance while shifting weight and ability to step, takes independent steps, does not use

## 2024-06-23 NOTE — ED NOTES
2058 [facility] called back with Dr. Heller on the line speaking with KAMILLA Givens

## 2024-06-23 NOTE — ED NOTES
2016 perfect serve sent to Dr. Blanton  2030 Consult completed with call back from Dr. Blanton speaking with KAMILLA Givens

## 2024-06-23 NOTE — ED PROVIDER NOTES
following study(s) labs which show troponin was normal range and story not suggestive of acute coronary syndrome.  Urinalysis was negative for infection.  Lipase and liver enzymes were normal range suggesting against choledocholithiasis or pancreatitis.  White blood cell count was technically normal range on the higher end based on sepsis criteria.          Exclusion criteria - the patient is NOT to be included for SEP-1 Core Measure due to:  2+ SIRS criteria are not met         I was the primary provider for the patient along with KAMILLA Fong.    MDM: Patient was evaluated due to concern for significant abdominal pain today associate with nausea.  PA reports based on the amount of pain he had on arrival she was concerned about possible dissection and therefore CTA was obtained showing no concern for this but concern for cholecystitis which could explain his symptoms.  Vascular surgery was consulted by PA and they reported outpatient management is reasonable per her note.  Ultimately was determined to admit the patient overnight to ensure no worsening concern for cholecystitis.  He was stable for the floor.  Troponin was negative and story not suggestive of acute coronary syndrome or pulm embolism.          Comment: Please note this report has been produced using speech recognition software and may contain errors related to that system including errors in grammar, punctuation, and spelling, as well as words and phrases that may be inappropriate. If there are any questions or concerns please feel free to contact the dictating provider for clarification.               Eric Zendejas MD  06/23/24 0053    
significant infection or hematuria on urinalysis.  He has a white blood cell count of 11.7.  No significant electrolyte derangement or kidney injury.  Normal lipase.  Normal troponin with a nonischemic EKG.  Patient has been without pain and appearing comfortably.    Patient CT scan reveals concern for acute cholecystitis with thickened gallbladder wall and multiple gallstones.  I did consult with surgery Dr. Blanton.  He feels comfortable with the patient being admitted to the hospital for IV antibiotics holding his Eliquis and having surgery in the next couple of days.  Patient and family are comfortable with this plan of care.    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  They do feel comfortable with the patient staying here in [place] and feel that this can be managed outpatient with them.  This incidental finding was discussed with the patient and family at bedside and they understand the importance of follow-up.    The patient tolerated their visit well.  The patient and / or the family were informed of the results of any tests, a time was given to answer questions, a plan was proposed and they agreed with plan.    I am the Primary Clinician of Record.  FINAL IMPRESSION      1. Cholecystitis    2. Nausea and vomiting, unspecified vomiting type    3. Pain of upper abdomen          DISPOSITION/PLAN     DISPOSITION Decision To Admit 06/22/2024 08:48:21 PM      PATIENT REFERRED TO:  No follow-up provider specified.    DISCHARGE MEDICATIONS:  New Prescriptions    No medications on file       DISCONTINUED MEDICATIONS:  Discontinued Medications    No medications on file              (Please note that portions of this note were completed with a voice recognition program.  Efforts were made to edit the dictations but occasionally words are mis-transcribed.)    Candi Fong PA-C (electronically signed)      Candi Fong

## 2024-06-23 NOTE — CONSULTS
Department of General Surgery Consult    PATIENT NAME: Turner Roberson   YOB: 1945    ADMISSION DATE: 6/22/2024  5:04 PM      TODAY'S DATE: 6/23/2024    Reason for Consult:  biliary colic    Chief Complaint: epigastric/RUQ pain    Historian: patient    Requesting Practitioner:  Black    HISTORY OF PRESENT ILLNESS:              The patient is a 78 y.o. male who presents with <1 day h/o epigastric abdominal pain. Began dull, mild with associated nausea and vomiting; pain steadily increased in severity, leading him to come to ED.  Denies fever, chills, jaundice.  No prior h/o similar pains.  In ED, noted mild leukocytosis and CT showed gallstones and possible mild cholecystitis (also w/ ulcerated plaque in infrarenal aorta - Vascular Surg called).    Today pt states pain has fully resolved, he is hungry.    Past Medical History:        Diagnosis Date    Cancer (HCC)     prostate cancer    Hemorrhoids, external     Hepatitis     Hernia of unspecified site of abdominal cavity without mention of obstruction or gangrene     Kidney disease     Measles     Type II or unspecified type diabetes mellitus without mention of complication, not stated as uncontrolled        Past Surgical History:        Procedure Laterality Date    CARDIAC ELECTROPHYSIOLOGY MAPPING AND ABLATION  05/25/2023    CYSTOSCOPY Right 03/19/2018     cysto, right ureteroscopy, holmium laser and stone removal, stent placement 6 x 24 JJ    NASAL POLYP SURGERY      TONSILLECTOMY         Current Medications:   Current Facility-Administered Medications: sodium chloride flush 0.9 % injection 5-40 mL, 5-40 mL, IntraVENous, 2 times per day  sodium chloride flush 0.9 % injection 5-40 mL, 5-40 mL, IntraVENous, PRN  0.9 % sodium chloride infusion, , IntraVENous, PRN  potassium chloride (KLOR-CON M) extended release tablet 40 mEq, 40 mEq, Oral, PRN **OR** potassium bicarb-citric acid (EFFER-K) effervescent tablet 40 mEq, 40 mEq, Oral, PRN **OR**

## 2024-06-23 NOTE — PROGRESS NOTES
Patient admitted to room _218___ from Dr. Office/tracy admit/ER. Patient oriented to room, call light, bed rails, phone, lights and bathroom. Patient instructed about the schedule of the day including: vital sign frequency, lab draws, possible tests, frequency of MD and staff rounds, daily weights, I &O's and prescribed diet. Bed alarm deferred patient low fall risk and refuses alarm. Bed locked, in lowest position, side rails up 2/4, call light within reach.    Recliner Assessment:     Patient is able to demonstrate the ability to move from a reclining position to an upright position within the recliner.     Bedside Mobility Assessment Tool (BMAT):     Assessment Level 1- Sit and Shake    1. From a semi-reclined position, ask patient to sit up and rotate to a seated position at the side of the bed. Can use the bedrail.    2. Ask patient to reach out and grab your hand and shake making sure patient reaches across his/her midline.   Pass- Patient is able to come to a seated position, maintain core strength. Maintains seated balance while reaching across midline. Move on to Assessment Level 2.     Assessment Level 2- Stretch and Point   1. With patient in seated position at the side of the bed, have patient place both feet on the floor (or stool) with knees no higher than hips.    2. Ask patient to stretch one leg and straighten the knee, then bend the ankle/flex and point the toes. If appropriate, repeat with the other leg.   Pass- Patient is able to demonstrate appropriate quad strength on intended weight bearing limb(s). Move onto Assessment Level 3.     Assessment Level 3- Stand   1. Ask patient to elevate off the bed or chair (seated to standing) using an assistive device (cane, bedrail).    2. Patient should be able to raise buttocks off be and hold for a count of five. May repeat once.   Pass- Patient maintains standing stability for at least 5 seconds, proceed to assessment level 4.    Assessment Level 4-

## 2024-06-23 NOTE — H&P
[facility] Medicine History & Physical      Patient Name: Turner Roberson    : 1945    PCP: Juan Pablo Pan MD    Date of Service:  Patient seen and examined on 24     Chief Complaint: Abdominal pain    History Of Present Illness:    Turner Roberson is a 78 y.o. male with a PMH of type 2 diabetes, hypertension, A-fib status postcardiac ablation on Eliquis hyperlipidemia, who presented to ED with complaint of abdominal pain.  Patient endorses severe upper abdominal pain today with associated nausea and vomiting, no previous history, presents to the ED for further evaluation.  Vitals in the ED, blood pressure 134/55, pulse of 72 temperature 98.6 respiration 18 saturation 96%.  Labs show sodium 141 potassium 4.5 chloride of 105 BUN of 26 creatinine 1.0 glucose of 197 LFTs stable.  Hemoglobin 14.5, WBC 11.7.  Urinalysis not indicative of a UTI.  CTA chest abdomen and pelvis shows no evidence of thoracic aortic aneurysm or dissection, ulcerated plaque at the anterior aspect of the infrarenal abdominal aorta, cholelithiasis with thickening of the gallbladder wall suggesting cholecystitis.  In the ED patient received Dilaudid 1 mg IV, Glucophage 1 g p.o., Zofran IV, 3.375 g of Zosyn, 1 L bolus of 0.9% sodium chloride.  General surgery and vascular surgery consulted.      Past Medical History:    Patient has a past medical history of Cancer (HCC), Hemorrhoids, external, Hepatitis, Hernia of unspecified site of abdominal cavity without mention of obstruction or gangrene, Kidney disease, Measles, and Type II or unspecified type diabetes mellitus without mention of complication, not stated as uncontrolled.    Past Surgical History:    Patient has a past surgical history that includes Nasal polyp surgery; Cystocopy (Right, 2018); and Cardiac electrophysiology mapping and ablation (2023).    Medications Prior to Admission:      Prior to Admission medications    Medication Sig Start Date End

## 2024-06-23 NOTE — ED NOTES
2123 Provided Pt with box Lunch, chocolate, and diet Pepsi. Pt states \"I need Metformin after I eat.\" Informed RN

## 2024-06-23 NOTE — PROGRESS NOTES
Pt leaving via private vehicle to home. Discharge instructions given. Pt voiced understanding. Copy of discharge and scripts with patient. Iv removed. CP and PE completed. No further needs at discharge. Pt leaving with all personal belonging.

## 2024-06-23 NOTE — CARE COORDINATION
DISCHARGE ORDER  Date/Time 2024 11:17 AM  Completed by: Teresa Boeck, RN, Case Management    Patient Name: Turner Roberson      : 1945  Admitting Diagnosis: Acute cholecystitis [K81.0]  Cholecystitis [K81.9]  Pain of upper abdomen [R10.10]  Nausea and vomiting, unspecified vomiting type [R11.2]      Admit order Date and Status: 24  (verify MD's last order for status of admission)      Noted discharge order.   If applicable PT/OT recommendation at Discharge: NA  DME recommendation by PT/OT:NA  Confirmed discharge plan: Yes  with whom - the patient  If pt confirmed DC plan does family need to be contacted by CM No if yes who______  Discharge Plan: The patient will discharge to home and will follow-up outpatient for gall bladder removal.     Date of Last IMM Given: 24    Reviewed chart.  Role of discharge planner explained and patient verbalized understanding. Discharge order is noted.    Has Home O2 in place on admit:  No  Informed of need to bring portable home O2 tank on day of discharge for nursing to connect prior to leaving:   Not Indicated  Verbalized agreement/Understanding:   Not Indicated  Pt is being d/c'd to home today. Pt's O2 sats are 98% on RA.    Discharge timeout done with Mihir HINKLE. All discharge needs and concerns addressed.

## 2024-06-23 NOTE — PLAN OF CARE
Problem: Chronic Conditions and Co-morbidities  Goal: Patient's chronic conditions and co-morbidity symptoms are monitored and maintained or improved  Outcome: Progressing     Problem: Safety - Adult  Goal: Free from fall injury  Outcome: Progressing     Problem: Gastrointestinal - Adult  Goal: Minimal or absence of nausea and vomiting  6/23/2024 0122 by Laverne Perez RN  Outcome: Progressing  6/23/2024 0122 by Laverne Perez RN  Outcome: Progressing     Problem: Gastrointestinal - Adult  Goal: Maintains or returns to baseline bowel function  Outcome: Progressing

## 2024-06-23 NOTE — PLAN OF CARE
Problem: Discharge Planning  Goal: Discharge to home or other facility with appropriate resources  Outcome: Completed  Flowsheets  Taken 6/23/2024 0104 by Laverne Perez RN  Discharge to home or other facility with appropriate resources: Identify barriers to discharge with patient and caregiver  Taken 6/23/2024 0103 by Laverne Perez RN  Discharge to home or other facility with appropriate resources: Identify barriers to discharge with patient and caregiver     Problem: Chronic Conditions and Co-morbidities  Goal: Patient's chronic conditions and co-morbidity symptoms are monitored and maintained or improved  6/23/2024 1051 by Mihir Reed RN  Outcome: Completed  6/23/2024 0122 by Laverne Perez RN  Outcome: Progressing     Problem: Safety - Adult  Goal: Free from fall injury  6/23/2024 1051 by Mihir Reed RN  Outcome: Completed  6/23/2024 0122 by Laverne Perez RN  Outcome: Progressing     Problem: Gastrointestinal - Adult  Goal: Minimal or absence of nausea and vomiting  6/23/2024 1051 by Mihir Reed RN  Outcome: Completed  6/23/2024 0122 by Laverne Perez RN  Outcome: Progressing  6/23/2024 0122 by Laverne Perez RN  Outcome: Progressing  Goal: Maintains or returns to baseline bowel function  6/23/2024 1051 by Mihir Reed RN  Outcome: Completed  6/23/2024 0122 by Laverne Perez RN  Outcome: Progressing  Goal: Maintains adequate nutritional intake  Outcome: Completed     Problem: Discharge Planning  Goal: Discharge to home or other facility with appropriate resources  Outcome: Completed  Flowsheets  Taken 6/23/2024 0104 by Laverne Perez RN  Discharge to home or other facility with appropriate resources: Identify barriers to discharge with patient and caregiver  Taken 6/23/2024 0103 by Laverne Perez RN  Discharge to home or other facility with appropriate resources: Identify barriers to discharge with patient and caregiver     Problem: Chronic Conditions and Co-morbidities  Goal:

## 2024-06-24 ENCOUNTER — TELEPHONE (OUTPATIENT)
Dept: SURGERY | Age: 79
End: 2024-06-24

## 2024-06-24 NOTE — TELEPHONE ENCOUNTER
Patient called [facility] to inquire about having a  ultra sound and poss gall bladder surgery. Patient did refer to seeing Dr. Blanton over the weekend and said he has no problem going to [facility] for testing and surgery.   Call sent back to Teresita at [facility].

## 2024-06-24 NOTE — TELEPHONE ENCOUNTER
Pt called and said he saw Dr. Blanton in [facility] this past Sat 6/22 for gallbladder issues. He said Dr. Blanton told him to call the office and get an US scheduled (and the pt would like to have it done at [place] since he lives out that way). Please call him and thank you! # [phone]

## 2024-06-25 DIAGNOSIS — K81.0 ACUTE CHOLECYSTITIS: Primary | ICD-10-CM

## 2024-06-26 NOTE — TELEPHONE ENCOUNTER
I called pt and gave him # to call and schedule US. He said that after he schedules that, he will call back to schedule consult with Dr. Blanton. Thank you!

## 2024-06-27 NOTE — TELEPHONE ENCOUNTER
Pls tell patient:  Treatment efficacy looks good and from my perspective we can continue at these current pressures.  Pls ask him if this decrease in pressure helped or if he is still struggling with bloating and difficulty tolerating pressures with new mask?

## 2024-06-28 NOTE — TELEPHONE ENCOUNTER
Hopefully he gets some relief soon.  I changed settings back to 9-11, thank you very much.  Order signed

## 2024-06-28 NOTE — TELEPHONE ENCOUNTER
Spoke with pt and informed with verbal understanding.  Pt states that he found out what the issue was, that he wasn't bloating, he was in a gallbladder attack.  Pt states that he has to have his gallbladder removed.  Pt states that we can put the settings back to where they were as he was able to sleep better with those settings.  Please advise.     Of note, pt isn't scheduled for gallbladder removal yet until he does an ultrasound tomorrow.

## 2024-06-29 ENCOUNTER — HOSPITAL ENCOUNTER (OUTPATIENT)
Dept: ULTRASOUND IMAGING | Age: 79
Discharge: HOME | End: 2024-06-29
Attending: SURGERY
Payer: COMMERCIAL

## 2024-06-29 DIAGNOSIS — K81.0 ACUTE CHOLECYSTITIS: ICD-10-CM

## 2024-06-29 PROCEDURE — 76705 ECHO EXAM OF ABDOMEN: CPT

## 2024-07-01 NOTE — TELEPHONE ENCOUNTER
Notified pt with verbal understanding. [facility] via [employer] at [phone].

## 2024-07-02 ENCOUNTER — TELEMEDICINE (OUTPATIENT)
Dept: SURGERY | Age: 79
End: 2024-07-02
Payer: COMMERCIAL

## 2024-07-02 DIAGNOSIS — K81.0 ACUTE CHOLECYSTITIS: Primary | ICD-10-CM

## 2024-07-02 PROCEDURE — 99214 OFFICE O/P EST MOD 30 MIN: CPT | Performed by: SURGERY

## 2024-07-02 PROCEDURE — 1123F ACP DISCUSS/DSCN MKR DOCD: CPT | Performed by: SURGERY

## 2024-07-02 RX ORDER — INDOCYANINE GREEN AND WATER 25 MG
5 KIT INJECTION ONCE
OUTPATIENT
Start: 2024-07-02 | End: 2024-07-02

## 2024-07-02 RX ORDER — SODIUM CHLORIDE 9 MG/ML
INJECTION, SOLUTION INTRAVENOUS PRN
OUTPATIENT
Start: 2024-07-02

## 2024-07-02 RX ORDER — SODIUM CHLORIDE 0.9 % (FLUSH) 0.9 %
5-40 SYRINGE (ML) INJECTION EVERY 12 HOURS SCHEDULED
OUTPATIENT
Start: 2024-07-02

## 2024-07-02 RX ORDER — SODIUM CHLORIDE 0.9 % (FLUSH) 0.9 %
5-40 SYRINGE (ML) INJECTION PRN
OUTPATIENT
Start: 2024-07-02

## 2024-07-02 NOTE — PROGRESS NOTES
sea    New Patient Consult    [facility]  [facility] General Surgery [facility]  Cresencio Blanton MD    [address], [address]  [place], [place] [address]  Phone: [phone]  Fax: [phone]    Turner Roberson   YOB: 1945    Date of Visit:  7/2/2024    No ref. provider found  Juan Pablo Pan MD    HPI:     Gallbladder: Patient is 78 y.o. year old male seen at request of Juan Pablo Pan MD.   Patient presents for evaluation of gallbladder problems. Problems were first noted {numbers; 0-10:89998} {unit:11} ago. Current symptoms include {liver specific symptoms:46103} ***. Patient denies {liver specific symptoms:36539}.  Symptoms are {symptom progression:16567}.    No Known Allergies  Outpatient Medications Marked as Taking for the 7/2/24 encounter (Telemedicine) with Cresencio Blanton MD   Medication Sig Dispense Refill    ciprofloxacin (CIPRO) 500 MG tablet Take 1 tablet by mouth 2 times daily for 10 days 20 tablet 0    metroNIDAZOLE (FLAGYL) 500 MG tablet Take 1 tablet by mouth 3 times daily for 10 days 30 tablet 0    Lancets 30G MISC by NOT APPLICABLE route      blood glucose test strips (EXACTECH TEST) strip by NOT APPLICABLE route      ONE TOUCH CLUB LANCETS MISC Place onto the skin      lisinopril (PRINIVIL;ZESTRIL) 10 MG tablet Take 1 tablet by mouth nightly      empagliflozin (JARDIANCE) 25 MG tablet Take 0.5 tablets by mouth daily      ELIQUIS 5 MG TABS tablet Take 1 tablet by mouth in the morning and at bedtime Resume med on 5/2/2023 60 tablet 3    aspirin 81 MG EC tablet Take 1 tablet by mouth daily 30 tablet 0    atorvastatin (LIPITOR) 40 MG tablet Take 1 tablet by mouth nightly 30 tablet 0    fluticasone (FLONASE) 50 MCG/ACT nasal spray 2 sprays by Each Nostril route daily 16 g 0    glimepiride (AMARYL) 4 MG tablet Take 0.5 tablets by mouth every morning (before breakfast)      metFORMIN (GLUCOPHAGE) 1000 MG tablet Take 1 tablet by mouth 2 times daily (with meals) Morning and evening

## 2024-07-03 ENCOUNTER — PREP FOR PROCEDURE (OUTPATIENT)
Dept: SURGERY | Age: 79
End: 2024-07-03

## 2024-07-03 ENCOUNTER — TELEPHONE (OUTPATIENT)
Dept: SURGERY | Age: 79
End: 2024-07-03

## 2024-07-03 NOTE — PROGRESS NOTES
Turner Ingramquita    Age 78 y.o.    male    1945    MRN [id number]    7/31/2024  Arrival Time_____________  OR Time____________115 Min     Procedure(s):  ROBOTIC CHOLECYSTECTOMY WITH INTRAOPERATIVE CHOLANGIOGRAM, POSSIBLE OPEN PROCEDURE                      General   Surgeon(s):  Blanton, Cresencio Henriquez, MD      DAY ADMIT ___  SDS/OP ___  OUTPT IN BED ___        Phone [phone] (home)                  PCP _____________________ Phone_________________ Epic ( ) Epic CE ( ) Appt ________    NOTES: _________________________________________ Consult/Cardio _______________    ____________________________________________________________________________    ____________________________________________________________________________  PAT APPT DATE:________ TIME: ________  FAXED QAD: _______  (__) H&P w/ Hospitalist    (__) PAT orders in EPIC    (__) Meet with PAT nurse  __________________________________________________________________________  Preop Nurse phone screen complete: _____________  (__) CBC     (__) W/ DIFF ___________  (__) CT CHEST  __________   (__) Hgb A1C    ___________  (__) CHEST X RAY   __________  (__) LIPID PROFILE  ___________  (__) EKG   __________  (__) PT-INR / APTT  ___________  (__) PFT's   __________  (__) BMP   ___________  (__) CAROTIDS  __________  (__) CMP   ___________  (__) VEIN MAPPING  __________  (__) U/A   ___________  ( X ) HISTORY & PHYSICAL __________  (__) URINE C & S  ___________  (__) CARDIAC CLEARANCE __________  (__) U/A W/ FLEX  ___________  (__) PULM. CLEARANCE __________  (__) SERUM PREGNANCY ___________  (__) Preop Orders in EPIC __________  (__) TYPE & SCREEN __________repeat ( ) (__)  __________________ __________  (__) Albumin   ___________  (__)  __________________ __________  (__) TRANSFERRIN  ___________  (__)  __________________ __________  (__) LIVER PROFILE  ___________  (__) URINE PREG DOS __________  (__) MRSA NASAL SWAB ___________  (__) BLOOD SUGAR

## 2024-07-03 NOTE — PROGRESS NOTES
Turner Roberson (:  1945) is a 78 y.o. male,Established patient, here for evaluation of the following chief complaint(s):  Other (Discuss US Results from 24 - Send V-Visit Link to: [phone])      Assessment & Plan   1. Acute cholecystitis  I reviewed the technical aspects of minimally invasive cholecystectomy.  The risks and complications, including but not limited to bleeding, infection, injury to surrounding structures, need to convert to open, and bile duct injury were reviewed.  Patient appears to understand, asks appropriate questions, and agrees to proceed.        Subjective   HPI  Patient calls today to follow up after recent evaluation in-hospital when he was admitted with acute cholecystitis; had very short stay due to rapid improvement in symptoms, and has been doing well at home.  Was send for outpatient RUQ ultrasound as part of his pre-op workup.  Currently denies pain, nausea, fever, or jaundice.    RUQ U/S  FINDINGS:  LIVER:  Increased echogenicity is seen throughout the liver.  No biliary  ductal dilatation.  No definite mass.     BILIARY SYSTEM:  Small gallstone is seen.  No pericholecystic fluid.  No  sonographic Polanco sign.  Gallbladder wall thickness measures 2 mm.     Common bile duct is within normal limits measuring 4 mm.     RIGHT KIDNEY: The right kidney is grossly unremarkable without evidence of  hydronephrosis.     PANCREAS:  Visualized portions of the pancreas are unremarkable.     OTHER: No evidence of right upper quadrant ascites.     IMPRESSION:  Cholelithiasis.  No cholecystitis noted by ultrasound.     Increased echogenicity throughout the liver suggesting diffuse hepatocellular  disease such as fatty infiltration    Objective   Physical Exam       On this date 2024 I have spent 15 minutes reviewing previous notes, test results and face to face with the patient discussing the diagnosis and importance of compliance with the treatment plan as well as

## 2024-07-03 NOTE — TELEPHONE ENCOUNTER
Pt had a Virtual Visit with Dr Blanton on 7/2/24 and needed to get scheduled for surgery - Called and spoke w/ pt this morning and scheduled him for a Robotic Cholecystectomy with Intraoperative Cholangiogram, Possible Open Procedure (General Anes) on Wed 7/31/24 @ 9:45am arrival 7:45am [facility] - NPO after midnight ubaldo b/f surgery - Pt will need  day of surgery - Dr Pan to complete pre-op physical - Pt will need to stop Eliquis prior to surgery - Cardiac clearance request faxed to Dr Whyte's office (see media) - Pt understood and agreed w/ above noted - Surgery instructions emailed to pt: [email or web address] (see media)

## 2024-07-09 ENCOUNTER — TELEPHONE (OUTPATIENT)
Dept: CARDIOLOGY CLINIC | Age: 79
End: 2024-07-09

## 2024-07-09 NOTE — TELEPHONE ENCOUNTER
Turner Roberson, 1945    Cardiac Risk Assessment    What type of procedure are you having?  DV brina with 10c, poss open     When is your procedure scheduled for?  07/31/2024    Medications to be stopped.  Eliquis     What physician is performing your procedure?  Dr. Cresencio Blanton     Phone Number:   [phone]    Fax number to send the letter:   [phone]    Cardiologist:   ROGE     Last Appointment:   12/06/2023    Next Appointment:   12/04/2024    Are you on any blood thinners?   YES       MONICA CALDWELL

## 2024-07-10 NOTE — TELEPHONE ENCOUNTER
Jignesh Brody MD  [facility] Ep13 hours ago (6:01 PM)       The patient is at moderate cardiovascular risk for the planned procedure based on the presence of coronary artery disease.

## 2024-07-18 ENCOUNTER — OFFICE VISIT (OUTPATIENT)
Dept: VASCULAR SURGERY | Age: 79
End: 2024-07-18
Payer: COMMERCIAL

## 2024-07-18 VITALS
HEIGHT: 75 IN | BODY MASS INDEX: 27.23 KG/M2 | WEIGHT: 219 LBS | DIASTOLIC BLOOD PRESSURE: 60 MMHG | SYSTOLIC BLOOD PRESSURE: 110 MMHG

## 2024-07-18 DIAGNOSIS — I71.9 ATHEROSCLEROTIC ULCER OF AORTA (HCC): Primary | ICD-10-CM

## 2024-07-18 DIAGNOSIS — I70.0 ATHEROSCLEROTIC ULCER OF AORTA (HCC): Primary | ICD-10-CM

## 2024-07-18 PROCEDURE — 1123F ACP DISCUSS/DSCN MKR DOCD: CPT | Performed by: SURGERY

## 2024-07-18 PROCEDURE — 99204 OFFICE O/P NEW MOD 45 MIN: CPT | Performed by: SURGERY

## 2024-07-18 PROCEDURE — 3074F SYST BP LT 130 MM HG: CPT | Performed by: SURGERY

## 2024-07-18 PROCEDURE — 3078F DIAST BP <80 MM HG: CPT | Performed by: SURGERY

## 2024-07-18 NOTE — PROGRESS NOTES
Outpatient Consultation / H&P    Date of Consultation:  7/18/2024    PCP:  Juan Pablo Pan MD     Referring Provider:  Dr. Pan     Chief Complaint:   Chief Complaint   Patient presents with    Other     Patient ref by Dr Pan for atherosclerosis of abdominal aorta and iliac arteries.pamlr       History of Present Illness:   We are asked to see this patient in consultation by Dr. Pan regarding abnormal CT in regards to the aorta.   Turner Roberson is a 78 y.o. male who underwent CTA for evaluation of epigastric pain.  He was diagnosed with cholecystitis and is to undergo cholecystectomy.  CTA also showed an ulcerated plaque in the infrarenal abdominal aorta.  Patient is currently without back or abdominal pain.      Past Medical History:  Past Medical History:   Diagnosis Date    Cancer (HCC)     prostate cancer    Hemorrhoids, external     Hepatitis     Hernia of unspecified site of abdominal cavity without mention of obstruction or gangrene     Kidney disease     Measles     Type II or unspecified type diabetes mellitus without mention of complication, not stated as uncontrolled        Past Surgical History:  Past Surgical History:   Procedure Laterality Date    CARDIAC ELECTROPHYSIOLOGY MAPPING AND ABLATION  05/25/2023    CYSTOSCOPY Right 03/19/2018     cysto, right ureteroscopy, holmium laser and stone removal, stent placement 6 x 24 JJ    NASAL POLYP SURGERY      TONSILLECTOMY         Home Medications:   Prior to Admission medications    Medication Sig Start Date End Date Taking? Authorizing Provider   Lancets 30G MISC by NOT APPLICABLE route 10/26/16  Yes Morgan Spencer MD   blood glucose test strips (EXACTECH TEST) strip by NOT APPLICABLE route 10/26/16  Yes Morgan Spencer MD   ONE TOUCH CLUB LANCETS MISC Place onto the skin 10/26/16  Yes Morgan Spencer MD   lisinopril (PRINIVIL;ZESTRIL) 10 MG tablet Take 1 tablet by mouth nightly 5/9/23  Yes Morgan Spencer MD

## 2024-07-24 NOTE — PROGRESS NOTES
Surgery Date and Time: 7/31/24 @ 9:30 am     Arrival Time:  7:30 am    The instructions given when and if a patient needs to stop oral intake prior to surgery varies. Follow the instructions you were given by your    Surgeon or RN during the Pre-op call.       __X__Nothing to eat or to drink after Midnight the night before the surgery. NO gum, mints, candy or ice chips day of surgery.                  Only take the following medications with a small sip of water the morning of surgery:    NONE (no diabetic medications morning of surgery)                 Hold Jardiance 3 days prior - last dose 7/27                 Hold the following medications (per anesthesia or surgeon request):  Lisinopril - hold 24 hrs prior   Last Dose:  7/29 pm      Aspirin, Ibuprofen, Advil, Naproxen, Vitamin E and other Anti-inflammatory products and supplements should be stopped for 5 -7days before surgery      or as directed by your physician.  Continue to hold Aspirin       Check with your Surgeon/PCP/Cardiologist regarding stopping Plavix, Coumadin, Eliquis, Lovenox, Effient, Pradaxa, Xarelto, Fragmin or other blood thinners     and follow their instructions.  Medication:   Eliquis - hold 2 days prior      Last dose: 7/28 pm     - Do not smoke or vape, and do not drink any alcoholic beverages 24 hours prior to surgery, this includes NA Beer. Refrain from using any recreational drugs,     including non-prescribed prescription drugs.     -You may brush your teeth and gargle the morning of surgery.  DO NOT SWALLOW WATER.    -You MUST plan for a responsible adult to stay on site while you are here and take you home after your surgery. You will not be allowed to leave alone or drive               yourself home. It is requested someone stay with you the first 24 hrs. Your surgery will be cancelled if you do not have a ride home with a responsible adult.    -Please wear simple, loose-fitting clothing to the hospital. Do not bring valuables

## 2024-07-30 ENCOUNTER — ANESTHESIA EVENT (OUTPATIENT)
Dept: OPERATING ROOM | Age: 79
End: 2024-07-30
Payer: COMMERCIAL

## 2024-07-30 ENCOUNTER — TELEPHONE (OUTPATIENT)
Dept: PULMONOLOGY | Age: 79
End: 2024-07-30

## 2024-07-30 NOTE — TELEPHONE ENCOUNTER
Patient called just [facility] that he will be having gull bladder surgery and they requested him bring his machine and since his appt is so early he packed it up a day early so there won't be any data for that night.

## 2024-07-31 ENCOUNTER — ANESTHESIA (OUTPATIENT)
Dept: OPERATING ROOM | Age: 79
End: 2024-07-31
Payer: COMMERCIAL

## 2024-07-31 ENCOUNTER — HOSPITAL ENCOUNTER (OUTPATIENT)
Age: 79
Setting detail: OUTPATIENT SURGERY
Discharge: HOME | End: 2024-07-31
Attending: SURGERY | Admitting: SURGERY
Payer: COMMERCIAL

## 2024-07-31 ENCOUNTER — APPOINTMENT (OUTPATIENT)
Dept: GENERAL RADIOLOGY | Age: 79
End: 2024-07-31
Attending: SURGERY
Payer: COMMERCIAL

## 2024-07-31 VITALS
TEMPERATURE: 97.8 F | DIASTOLIC BLOOD PRESSURE: 68 MMHG | RESPIRATION RATE: 39 BRPM | SYSTOLIC BLOOD PRESSURE: 149 MMHG | OXYGEN SATURATION: 94 % | HEART RATE: 68 BPM

## 2024-07-31 DIAGNOSIS — K81.0 ACUTE CHOLECYSTITIS: ICD-10-CM

## 2024-07-31 DIAGNOSIS — G89.18 POSTOPERATIVE PAIN: Primary | ICD-10-CM

## 2024-07-31 LAB
GLUCOSE BLD-MCNC: 181 MG/DL (ref 70–99)
PERFORMED ON: ABNORMAL

## 2024-07-31 PROCEDURE — 6360000002 HC RX W HCPCS: Performed by: SURGERY

## 2024-07-31 PROCEDURE — 6360000004 HC RX CONTRAST MEDICATION: Performed by: SURGERY

## 2024-07-31 PROCEDURE — 7100000000 HC PACU RECOVERY - FIRST 15 MIN: Performed by: SURGERY

## 2024-07-31 PROCEDURE — S2900 ROBOTIC SURGICAL SYSTEM: HCPCS | Performed by: SURGERY

## 2024-07-31 PROCEDURE — 88304 TISSUE EXAM BY PATHOLOGIST: CPT

## 2024-07-31 PROCEDURE — 3700000000 HC ANESTHESIA ATTENDED CARE: Performed by: SURGERY

## 2024-07-31 PROCEDURE — 47563 LAPARO CHOLECYSTECTOMY/GRAPH: CPT | Performed by: SURGERY

## 2024-07-31 PROCEDURE — 7100000001 HC PACU RECOVERY - ADDTL 15 MIN: Performed by: SURGERY

## 2024-07-31 PROCEDURE — 6360000002 HC RX W HCPCS: Performed by: ANESTHESIOLOGY

## 2024-07-31 PROCEDURE — 2580000003 HC RX 258: Performed by: NURSE ANESTHETIST, CERTIFIED REGISTERED

## 2024-07-31 PROCEDURE — 3600000009 HC SURGERY ROBOT BASE: Performed by: SURGERY

## 2024-07-31 PROCEDURE — 3600000019 HC SURGERY ROBOT ADDTL 15MIN: Performed by: SURGERY

## 2024-07-31 PROCEDURE — 74300 X-RAY BILE DUCTS/PANCREAS: CPT

## 2024-07-31 PROCEDURE — 2709999900 HC NON-CHARGEABLE SUPPLY: Performed by: SURGERY

## 2024-07-31 PROCEDURE — 6360000002 HC RX W HCPCS: Performed by: NURSE ANESTHETIST, CERTIFIED REGISTERED

## 2024-07-31 PROCEDURE — 2500000003 HC RX 250 WO HCPCS: Performed by: NURSE ANESTHETIST, CERTIFIED REGISTERED

## 2024-07-31 PROCEDURE — 6370000000 HC RX 637 (ALT 250 FOR IP): Performed by: ANESTHESIOLOGY

## 2024-07-31 PROCEDURE — 7100000011 HC PHASE II RECOVERY - ADDTL 15 MIN: Performed by: SURGERY

## 2024-07-31 PROCEDURE — 7100000010 HC PHASE II RECOVERY - FIRST 15 MIN: Performed by: SURGERY

## 2024-07-31 PROCEDURE — 2500000003 HC RX 250 WO HCPCS: Performed by: SURGERY

## 2024-07-31 PROCEDURE — 3700000001 HC ADD 15 MINUTES (ANESTHESIA): Performed by: SURGERY

## 2024-07-31 RX ORDER — SODIUM CHLORIDE, SODIUM LACTATE, POTASSIUM CHLORIDE, CALCIUM CHLORIDE 600; 310; 30; 20 MG/100ML; MG/100ML; MG/100ML; MG/100ML
INJECTION, SOLUTION INTRAVENOUS CONTINUOUS PRN
Status: DISCONTINUED | OUTPATIENT
Start: 2024-07-31 | End: 2024-07-31 | Stop reason: SDUPTHER

## 2024-07-31 RX ORDER — ONDANSETRON 2 MG/ML
4 INJECTION INTRAMUSCULAR; INTRAVENOUS
Status: DISCONTINUED | OUTPATIENT
Start: 2024-07-31 | End: 2024-07-31 | Stop reason: HOSPADM

## 2024-07-31 RX ORDER — LIDOCAINE HYDROCHLORIDE 10 MG/ML
1 INJECTION, SOLUTION EPIDURAL; INFILTRATION; INTRACAUDAL; PERINEURAL
Status: DISCONTINUED | OUTPATIENT
Start: 2024-07-31 | End: 2024-07-31 | Stop reason: HOSPADM

## 2024-07-31 RX ORDER — NALOXONE HYDROCHLORIDE 0.4 MG/ML
INJECTION, SOLUTION INTRAMUSCULAR; INTRAVENOUS; SUBCUTANEOUS PRN
Status: DISCONTINUED | OUTPATIENT
Start: 2024-07-31 | End: 2024-07-31 | Stop reason: HOSPADM

## 2024-07-31 RX ORDER — SODIUM CHLORIDE, SODIUM LACTATE, POTASSIUM CHLORIDE, CALCIUM CHLORIDE 600; 310; 30; 20 MG/100ML; MG/100ML; MG/100ML; MG/100ML
INJECTION, SOLUTION INTRAVENOUS CONTINUOUS
Status: DISCONTINUED | OUTPATIENT
Start: 2024-07-31 | End: 2024-07-31 | Stop reason: HOSPADM

## 2024-07-31 RX ORDER — SODIUM CHLORIDE 9 MG/ML
INJECTION, SOLUTION INTRAVENOUS PRN
Status: DISCONTINUED | OUTPATIENT
Start: 2024-07-31 | End: 2024-07-31 | Stop reason: HOSPADM

## 2024-07-31 RX ORDER — INDOCYANINE GREEN AND WATER 25 MG
5 KIT INJECTION ONCE
Status: COMPLETED | OUTPATIENT
Start: 2024-07-31 | End: 2024-07-31

## 2024-07-31 RX ORDER — DEXAMETHASONE SODIUM PHOSPHATE 4 MG/ML
INJECTION, SOLUTION INTRA-ARTICULAR; INTRALESIONAL; INTRAMUSCULAR; INTRAVENOUS; SOFT TISSUE PRN
Status: DISCONTINUED | OUTPATIENT
Start: 2024-07-31 | End: 2024-07-31 | Stop reason: SDUPTHER

## 2024-07-31 RX ORDER — MIDAZOLAM HYDROCHLORIDE 1 MG/ML
2 INJECTION INTRAMUSCULAR; INTRAVENOUS
Status: DISCONTINUED | OUTPATIENT
Start: 2024-07-31 | End: 2024-07-31 | Stop reason: HOSPADM

## 2024-07-31 RX ORDER — OXYCODONE HYDROCHLORIDE AND ACETAMINOPHEN 5; 325 MG/1; MG/1
1 TABLET ORAL EVERY 4 HOURS PRN
Qty: 12 TABLET | Refills: 0 | Status: SHIPPED | OUTPATIENT
Start: 2024-07-31 | End: 2024-08-03

## 2024-07-31 RX ORDER — ESMOLOL HYDROCHLORIDE 10 MG/ML
INJECTION INTRAVENOUS PRN
Status: DISCONTINUED | OUTPATIENT
Start: 2024-07-31 | End: 2024-07-31 | Stop reason: SDUPTHER

## 2024-07-31 RX ORDER — LIDOCAINE HYDROCHLORIDE 20 MG/ML
INJECTION, SOLUTION EPIDURAL; INFILTRATION; INTRACAUDAL; PERINEURAL PRN
Status: DISCONTINUED | OUTPATIENT
Start: 2024-07-31 | End: 2024-07-31 | Stop reason: SDUPTHER

## 2024-07-31 RX ORDER — OXYCODONE HYDROCHLORIDE 5 MG/1
5 TABLET ORAL PRN
Status: COMPLETED | OUTPATIENT
Start: 2024-07-31 | End: 2024-07-31

## 2024-07-31 RX ORDER — SODIUM CHLORIDE 0.9 % (FLUSH) 0.9 %
5-40 SYRINGE (ML) INJECTION EVERY 12 HOURS SCHEDULED
Status: DISCONTINUED | OUTPATIENT
Start: 2024-07-31 | End: 2024-07-31 | Stop reason: HOSPADM

## 2024-07-31 RX ORDER — DIPHENHYDRAMINE HYDROCHLORIDE 50 MG/ML
12.5 INJECTION INTRAMUSCULAR; INTRAVENOUS
Status: DISCONTINUED | OUTPATIENT
Start: 2024-07-31 | End: 2024-07-31 | Stop reason: HOSPADM

## 2024-07-31 RX ORDER — SODIUM CHLORIDE 0.9 % (FLUSH) 0.9 %
5-40 SYRINGE (ML) INJECTION PRN
Status: DISCONTINUED | OUTPATIENT
Start: 2024-07-31 | End: 2024-07-31 | Stop reason: HOSPADM

## 2024-07-31 RX ORDER — PROPOFOL 10 MG/ML
INJECTION, EMULSION INTRAVENOUS PRN
Status: DISCONTINUED | OUTPATIENT
Start: 2024-07-31 | End: 2024-07-31 | Stop reason: SDUPTHER

## 2024-07-31 RX ORDER — OXYCODONE HYDROCHLORIDE 5 MG/1
10 TABLET ORAL PRN
Status: COMPLETED | OUTPATIENT
Start: 2024-07-31 | End: 2024-07-31

## 2024-07-31 RX ORDER — LABETALOL HYDROCHLORIDE 5 MG/ML
5 INJECTION, SOLUTION INTRAVENOUS EVERY 10 MIN PRN
Status: DISCONTINUED | OUTPATIENT
Start: 2024-07-31 | End: 2024-07-31 | Stop reason: HOSPADM

## 2024-07-31 RX ORDER — FENTANYL CITRATE 50 UG/ML
INJECTION, SOLUTION INTRAMUSCULAR; INTRAVENOUS PRN
Status: DISCONTINUED | OUTPATIENT
Start: 2024-07-31 | End: 2024-07-31 | Stop reason: SDUPTHER

## 2024-07-31 RX ORDER — BUPIVACAINE HYDROCHLORIDE 5 MG/ML
INJECTION, SOLUTION EPIDURAL; INTRACAUDAL PRN
Status: DISCONTINUED | OUTPATIENT
Start: 2024-07-31 | End: 2024-07-31 | Stop reason: ALTCHOICE

## 2024-07-31 RX ORDER — MEPERIDINE HYDROCHLORIDE 50 MG/ML
12.5 INJECTION INTRAMUSCULAR; INTRAVENOUS; SUBCUTANEOUS EVERY 5 MIN PRN
Status: DISCONTINUED | OUTPATIENT
Start: 2024-07-31 | End: 2024-07-31 | Stop reason: HOSPADM

## 2024-07-31 RX ORDER — ONDANSETRON 2 MG/ML
INJECTION INTRAMUSCULAR; INTRAVENOUS PRN
Status: DISCONTINUED | OUTPATIENT
Start: 2024-07-31 | End: 2024-07-31 | Stop reason: SDUPTHER

## 2024-07-31 RX ORDER — ROCURONIUM BROMIDE 10 MG/ML
INJECTION, SOLUTION INTRAVENOUS PRN
Status: DISCONTINUED | OUTPATIENT
Start: 2024-07-31 | End: 2024-07-31 | Stop reason: SDUPTHER

## 2024-07-31 RX ADMIN — LIDOCAINE HYDROCHLORIDE 50 MG: 20 INJECTION, SOLUTION EPIDURAL; INFILTRATION; INTRACAUDAL; PERINEURAL at 09:16

## 2024-07-31 RX ADMIN — ONDANSETRON 4 MG: 2 INJECTION INTRAMUSCULAR; INTRAVENOUS at 10:02

## 2024-07-31 RX ADMIN — FENTANYL CITRATE 75 MCG: 50 INJECTION, SOLUTION INTRAMUSCULAR; INTRAVENOUS at 09:32

## 2024-07-31 RX ADMIN — ESMOLOL HYDROCHLORIDE 10 MG: 10 INJECTION, SOLUTION INTRAVENOUS at 10:02

## 2024-07-31 RX ADMIN — SODIUM CHLORIDE, SODIUM LACTATE, POTASSIUM CHLORIDE, AND CALCIUM CHLORIDE: .6; .31; .03; .02 INJECTION, SOLUTION INTRAVENOUS at 08:24

## 2024-07-31 RX ADMIN — DEXAMETHASONE SODIUM PHOSPHATE 8 MG: 4 INJECTION, SOLUTION INTRAMUSCULAR; INTRAVENOUS at 10:02

## 2024-07-31 RX ADMIN — INDOCYANINE GREEN AND WATER 5 MG: KIT at 08:37

## 2024-07-31 RX ADMIN — HYDROMORPHONE HYDROCHLORIDE 0.5 MG: 1 INJECTION, SOLUTION INTRAMUSCULAR; INTRAVENOUS; SUBCUTANEOUS at 11:17

## 2024-07-31 RX ADMIN — HYDROMORPHONE HYDROCHLORIDE 0.5 MG: 1 INJECTION, SOLUTION INTRAMUSCULAR; INTRAVENOUS; SUBCUTANEOUS at 11:04

## 2024-07-31 RX ADMIN — FENTANYL CITRATE 75 MCG: 50 INJECTION, SOLUTION INTRAMUSCULAR; INTRAVENOUS at 09:16

## 2024-07-31 RX ADMIN — PROPOFOL 200 MG: 10 INJECTION, EMULSION INTRAVENOUS at 09:16

## 2024-07-31 RX ADMIN — SUGAMMADEX 200 MG: 100 INJECTION, SOLUTION INTRAVENOUS at 10:10

## 2024-07-31 RX ADMIN — ONDANSETRON 4 MG: 2 INJECTION INTRAMUSCULAR; INTRAVENOUS at 10:47

## 2024-07-31 RX ADMIN — HYDROMORPHONE HYDROCHLORIDE 0.5 MG: 1 INJECTION, SOLUTION INTRAMUSCULAR; INTRAVENOUS; SUBCUTANEOUS at 10:47

## 2024-07-31 RX ADMIN — OXYCODONE HYDROCHLORIDE 5 MG: 5 TABLET ORAL at 11:58

## 2024-07-31 RX ADMIN — ROCURONIUM BROMIDE 50 MG: 50 INJECTION, SOLUTION INTRAVENOUS at 09:16

## 2024-07-31 RX ADMIN — SODIUM CHLORIDE, SODIUM LACTATE, POTASSIUM CHLORIDE, AND CALCIUM CHLORIDE: .6; .31; .03; .02 INJECTION, SOLUTION INTRAVENOUS at 10:25

## 2024-07-31 ASSESSMENT — PAIN DESCRIPTION - ORIENTATION: ORIENTATION: MID

## 2024-07-31 ASSESSMENT — PAIN SCALES - GENERAL
PAINLEVEL_OUTOF10: 0
PAINLEVEL_OUTOF10: 6
PAINLEVEL_OUTOF10: 7
PAINLEVEL_OUTOF10: 8

## 2024-07-31 ASSESSMENT — PAIN DESCRIPTION - LOCATION
LOCATION: ABDOMEN
LOCATION: ABDOMEN

## 2024-07-31 ASSESSMENT — PAIN DESCRIPTION - DESCRIPTORS: DESCRIPTORS: ACHING;DISCOMFORT

## 2024-07-31 NOTE — H&P
I have reviewed the history and physical and examined the patient.  I find no relevant changes.  I have reviewed with the patient and/or family members, during the preoperative office visit the risks, benefits, and alternatives to the procedure.    LELE GOMEZ MD

## 2024-07-31 NOTE — OP NOTE
Operative Note      Patient: Turner Roberson  YOB: 1945  MRN: [id number]    Date of Procedure: 7/31/2024    Pre-Op Diagnosis Codes:     * Acute cholecystitis [K81.0]    Post-Op Diagnosis: Same       Procedure(s):  ROBOTIC CHOLECYSTECTOMY WITH INTRAOPERATIVE CHOLANGIOGRAM    Surgeon(s):  Cresencio Blanton MD    Assistant:   Surgical Assistant: Giorgio Howell    Anesthesia: General    Estimated Blood Loss (mL): less than 50     Complications: None    Specimens:   ID Type Source Tests Collected by Time Destination   A : gallbladder and contents Tissue Gallbladder SURGICAL PATHOLOGY Cresencio Blanton MD 7/31/2024 0956        Implants:  * No implants in log *      Drains: * No LDAs found *    Findings:  Infection Present At Time Of Surgery (PATOS) (choose all levels that have infection present):  No infection present  Other Findings: mild gallbladder inflammation             IOC - no filling defects, (+) flow into duodenum    Detailed Description of Procedure:   DESCRIPTION OF PROCEDURE: The patient was brought into the operating room   theater, placed supine on the operating room table, administered general anesthesia, intubated. The abdomen was then prepped and draped in a normal sterile fashion.  A trocar incision was made in the LUQ just off midline after infiltrating with local anesthesia. A trocar was placed through abdominal wall layers into peritoneal cavity under direct vision. The   abdomen was inspected. Under direct vision, a 12mm trocar was placed through a infraumbilical incision.  Another robotic trocar was placed in the right lateral subcostal region. An assist port was then placed in the right lower quadrant under direct vision.  The gallbladder appeared minimally distended and with mild edema/inflammation consistent with mild cholecystitis.  The fundus was grasped and elevated in a cranial direction.  The infundibulum was retracted laterally.  The cystic duct was dissected

## 2024-07-31 NOTE — PROGRESS NOTES
Patient arrived to PACU bay 3, phase one initiated. Placed on bedside monitor, VSS. Report obtained from OR RN and anesthesia. Patient on O2 via room air at 0L. Assessment WNL. Warm blankets applied. Side rails in place, will monitor patient closely.

## 2024-07-31 NOTE — DISCHARGE INSTRUCTIONS
[facility]    Cresencio Blanton M.D.   [place] Office      [facility]        [place]               [address]                [address]  Chance Fiore M.D.              [address]           [address]          [place] [address]         [place] [address]  Elias Muhammad M.D                         [phone]        [facility]                   Jamie Mcleod M.D.          [facility]       POST-OPERATIVE INSTRUCTIONS FOR GALLBLADDER SURGERY    Call the office to schedule your post-operative appointment with your surgeon for two (2) weeks.     You will have surgical glue closing your incisions.        You may shower.  Wash incisions gently, and pat them dry. Do not rub your incisions.    General guidelines for activity:   Avoid strenuous activity or lifting anything heavier than 20 pounds.    It is OK to be up  walking around, and walking up and down stairs.     Do what is comfortable: stop and rest when you feel tired.   Drink plenty of fluids and stay on a bland diet for 2-3 days after surgery.     Do NOT drive while taking your narcotic pain medicine.     Watch for signs of infection:  Excessive warmth or bright redness around your incisions  Leakage cloudy fluid from you incisions  Fever over 101.5  During the laparoscopic procedure that you had, gas is pumped into the abdominal cavity.  You may feel abdominal, shoulder, or rib pain for a few days due to this gas.    You will have pain medicine ordered. Take as directed    If you experience constipation:  Increase your water intake  Increase your activity, walking is best.  A stool softener or mild laxative may be necessary if you still have not had a bowel movement ; call the office for further instructions.    Please take note: IF you do not take all of your narcotic pain medication, we ask that you dispose of

## 2024-07-31 NOTE — ANESTHESIA PRE PROCEDURE
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       Past Medical History:        Diagnosis Date   • Atrial flutter (HCC)    • Cancer (HCC)     prostate cancer   • Hemorrhoids, external    • Hepatitis    • Hernia of unspecified site of abdominal cavity without mention of obstruction or gangrene    • Hyperlipidemia    • Hypertension    • Kidney disease    • Measles    • YOBANI on CPAP    • Type II or unspecified type diabetes mellitus without mention of complication, not stated as uncontrolled    • Wears partial dentures     upper       Past Surgical History:        Procedure Laterality Date   • CARDIAC ELECTROPHYSIOLOGY MAPPING AND ABLATION  2023   • CYSTOSCOPY Right 2018     cysto, right ureteroscopy, holmium laser and stone removal, stent placement 6 x 24 JJ   • NASAL POLYP SURGERY     • TONSILLECTOMY         Social History:    Social History     Tobacco Use   • Smoking status: Former     Types: Cigars     Quit date:      Years since quittin.5     Passive exposure: Past   • Smokeless tobacco: Never   Substance Use Topics   • Alcohol use: No                                Counseling given: Not Answered      Vital Signs (Current):   Vitals:    24 0735   BP: (!) 142/73   Pulse: 75   Resp: 16   Temp: 98.6 °F (37 °C)   TempSrc: Oral   SpO2: 98%                                              BP Readings from Last 3 Encounters:   24 (!) 142/73   24 110/60   24 139/76       NPO Status: Time of last liquid consumption:                         Time of last solid consumption:                         Date of last liquid consumption: 24                        Date of last solid food consumption: 24    BMI:   Wt Readings from Last 3 Encounters:   24 99.3 kg (219 lb)   24 103 kg (227 lb)   23 101.6 kg (224 lb)     There is no height or weight on file to calculate BMI.    CBC:   Lab Results   Component Value Date/Time    WBC 8.3 2024

## 2024-07-31 NOTE — PROGRESS NOTES
Patient admitted to hospitals 4 in preparation for surgery, VSS. Consent confirmed. IV inserted into RFA, LR infusing. Belongings shirt,shorts,undergarments,shoes,cpap,dentures upper, glasses, hearing aid to wife, David. NPO since 2100. Family at bedside, phone number in system for text updates, call light within reach.

## 2024-08-01 NOTE — ANESTHESIA POSTPROCEDURE EVALUATION
Department of Anesthesiology  Postprocedure Note    Patient: Turner Roberson  MRN: [id number]  YOB: 1945  Date of evaluation: 8/1/2024    Procedure Summary       Date: 07/31/24 Room / Location: [address]    Anesthesia Start: 0908 Anesthesia Stop: 1028    Procedure: ROBOTIC CHOLECYSTECTOMY WITH INTRAOPERATIVE CHOLANGIOGRAM (Abdomen) Diagnosis:       Acute cholecystitis      (Acute cholecystitis [K81.0])    Surgeons: Cresencio Blanton MD Responsible Provider: Richi Barton MD    Anesthesia Type: general ASA Status: 3            Anesthesia Type: No value filed.    Robbie Phase I: Robbie Score: 10    Robbie Phase II: Robbie Score: 10    Anesthesia Post Evaluation    Patient location during evaluation: PACU  Patient participation: complete - patient participated  Level of consciousness: awake and alert  Pain score: 0  Airway patency: patent  Nausea & Vomiting: no nausea and no vomiting  Cardiovascular status: blood pressure returned to baseline  Respiratory status: acceptable  Hydration status: stable  Pain management: adequate    No notable events documented.

## 2024-08-02 ENCOUNTER — TELEPHONE (OUTPATIENT)
Dept: SURGERY | Age: 79
End: 2024-08-02

## 2024-08-02 NOTE — TELEPHONE ENCOUNTER
Pt called and said he had  sx 7/31 by Dr. Blanton, but he has not had a BM since 7/30. Is he allowed to take Miralax? Please call him and thank you! # [phone]

## 2024-08-13 ENCOUNTER — OFFICE VISIT (OUTPATIENT)
Dept: SURGERY | Age: 79
End: 2024-08-13

## 2024-08-13 VITALS
WEIGHT: 215.4 LBS | SYSTOLIC BLOOD PRESSURE: 122 MMHG | BODY MASS INDEX: 26.78 KG/M2 | DIASTOLIC BLOOD PRESSURE: 62 MMHG | HEIGHT: 75 IN

## 2024-08-13 DIAGNOSIS — Z09 POSTOP CHECK: Primary | ICD-10-CM

## 2024-08-13 PROCEDURE — 99024 POSTOP FOLLOW-UP VISIT: CPT | Performed by: SURGERY

## 2024-08-14 NOTE — PROGRESS NOTES
Surgery Post-op Progress Note    HPI:  Notes reviewed, and agree with documentation in pt's chart.   Postoperative Follow-up: Patient presents for 2 week follow-up status post robotic cholecystectomy. Eating a regular diet without difficulty. Bowel movements are Normal.  The patient is not having any pain..     ROS:    10 point review of systems performed; please refer to HPI with pertinent positives, all other ROS are negative    A review of the patient's record including allergies, medication list, tobacco history, family history, problem list, medical history and social history has been completed and updates made to the patient's EMR where indicated.     PE:   CONSTITUTIONAL:  awake and alert    ABDOMEN: soft, non-distended, non-tender     INCISION: clean, dry, healing      ASSESSMENT:   Diagnosis Orders   1. Postop check        Doing well      PLAN:    Continue with routine wound care as discussed  Gradually increase activities as tolerated  Follow-up as needed; please call with questions or concerns

## 2024-08-15 PROBLEM — R11.2 NAUSEA AND VOMITING: Status: ACTIVE | Noted: 2024-08-15

## 2024-08-15 PROBLEM — R10.10 PAIN OF UPPER ABDOMEN: Status: ACTIVE | Noted: 2024-08-15

## 2024-09-24 ASSESSMENT — SLEEP AND FATIGUE QUESTIONNAIRES
HOW LIKELY ARE YOU TO NOD OFF OR FALL ASLEEP WHILE LYING DOWN TO REST IN THE AFTERNOON WHEN CIRCUMSTANCES PERMIT: SLIGHT CHANCE OF DOZING
HOW LIKELY ARE YOU TO NOD OFF OR FALL ASLEEP WHILE LYING DOWN TO REST IN THE AFTERNOON WHEN CIRCUMSTANCES PERMIT: SLIGHT CHANCE OF DOZING
HOW LIKELY ARE YOU TO NOD OFF OR FALL ASLEEP WHILE SITTING INACTIVE IN A PUBLIC PLACE: WOULD NEVER DOZE
HOW LIKELY ARE YOU TO NOD OFF OR FALL ASLEEP WHILE SITTING AND READING: SLIGHT CHANCE OF DOZING
HOW LIKELY ARE YOU TO NOD OFF OR FALL ASLEEP WHEN YOU ARE A PASSENGER IN A CAR FOR AN HOUR WITHOUT A BREAK: WOULD NEVER DOZE
HOW LIKELY ARE YOU TO NOD OFF OR FALL ASLEEP WHILE SITTING QUIETLY AFTER LUNCH WITHOUT ALCOHOL: SLIGHT CHANCE OF DOZING
HOW LIKELY ARE YOU TO NOD OFF OR FALL ASLEEP WHILE SITTING AND READING: SLIGHT CHANCE OF DOZING
ESS TOTAL SCORE: 4
HOW LIKELY ARE YOU TO NOD OFF OR FALL ASLEEP WHILE SITTING AND TALKING TO SOMEONE: WOULD NEVER DOZE
HOW LIKELY ARE YOU TO NOD OFF OR FALL ASLEEP WHEN YOU ARE A PASSENGER IN A CAR FOR AN HOUR WITHOUT A BREAK: WOULD NEVER DOZE
HOW LIKELY ARE YOU TO NOD OFF OR FALL ASLEEP IN A CAR, WHILE STOPPED FOR A FEW MINUTES IN TRAFFIC: WOULD NEVER DOZE
HOW LIKELY ARE YOU TO NOD OFF OR FALL ASLEEP WHILE WATCHING TV: SLIGHT CHANCE OF DOZING
HOW LIKELY ARE YOU TO NOD OFF OR FALL ASLEEP WHILE WATCHING TV: SLIGHT CHANCE OF DOZING
HOW LIKELY ARE YOU TO NOD OFF OR FALL ASLEEP WHILE SITTING INACTIVE IN A PUBLIC PLACE: WOULD NEVER DOZE
HOW LIKELY ARE YOU TO NOD OFF OR FALL ASLEEP IN A CAR, WHILE STOPPED FOR A FEW MINUTES IN TRAFFIC: WOULD NEVER DOZE
HOW LIKELY ARE YOU TO NOD OFF OR FALL ASLEEP WHILE SITTING AND TALKING TO SOMEONE: WOULD NEVER DOZE
HOW LIKELY ARE YOU TO NOD OFF OR FALL ASLEEP WHILE SITTING QUIETLY AFTER LUNCH WITHOUT ALCOHOL: SLIGHT CHANCE OF DOZING

## 2024-09-27 ENCOUNTER — OFFICE VISIT (OUTPATIENT)
Age: 79
End: 2024-09-27

## 2024-09-27 VITALS
SYSTOLIC BLOOD PRESSURE: 122 MMHG | HEIGHT: 75 IN | DIASTOLIC BLOOD PRESSURE: 62 MMHG | OXYGEN SATURATION: 98 % | RESPIRATION RATE: 20 BRPM | BODY MASS INDEX: 26.73 KG/M2 | WEIGHT: 215 LBS | HEART RATE: 87 BPM

## 2024-09-27 DIAGNOSIS — I48.0 PAROXYSMAL ATRIAL FIBRILLATION (HCC): Chronic | ICD-10-CM

## 2024-09-27 DIAGNOSIS — G47.33 OSA (OBSTRUCTIVE SLEEP APNEA): Primary | Chronic | ICD-10-CM

## 2024-09-30 ENCOUNTER — TELEPHONE (OUTPATIENT)
Dept: PULMONOLOGY | Age: 79
End: 2024-09-30

## 2024-09-30 NOTE — TELEPHONE ENCOUNTER
PAP supply order, OV and CR faxed to [facility] via [employer] at [phone].

## 2024-10-04 ENCOUNTER — TELEPHONE (OUTPATIENT)
Dept: PULMONOLOGY | Age: 79
End: 2024-10-04

## 2024-10-04 NOTE — TELEPHONE ENCOUNTER
Pt called stating that with the pressure increase, it is causing mask leak and his wife had to sleep in the other room last night due to the sounds.  Pt asking for the pressure to be lowered back down. Please advise.

## 2024-10-04 NOTE — TELEPHONE ENCOUNTER
No problem.  I decreased starting ramp back from 7 to 5 in [employer].  Confirmed in resmed that no other settings were changed.

## 2024-12-03 ASSESSMENT — ENCOUNTER SYMPTOMS
SNORING: 0
SLEEP DISTURBANCES DUE TO BREATHING: 0
WHEEZING: 0
SHORTNESS OF BREATH: 0
STRIDOR: 0
HEMATEMESIS: 0
RIGHT EYE: 0
LEFT EYE: 0
HEMATOCHEZIA: 0

## 2024-12-03 NOTE — PROGRESS NOTES
Assessment:     1. Atrial flutter: patient presented with persistent atrial flutter with ventricular rate mostly controlled. Appearance of his atrial flutter was highly suggestive of typical cavo-tricuspid isthmus dependent atrial flutter. Ventricular rate was reasonably well controlled with diltiazem although he had break-through episodes of rapid rates and also issues with bradycardia at times (minimum 30's).     Associated symptoms: Fatigue     History of cardioversion: No prior history of cardioversion  History of AF/AFL ablation: none  History of heart surgery/procedure: yes, coronary angiography with no intervention    Current use of anti-arrhythmic drugs: Not currently on anti-arrhythmic drugs  Previous use of anti-arrhythmic drugs: Not previously on any anti-arrhythmic drugs    Overall LV function: Normal left ventricular systolic function  Size of left atrium: Moderately dilated LA size  Significant cardiac valvular disease: No significant valve disease    Alcohol consumption: No alcohol consumption  Caffeine consumption: Mild caffeine intake (mostly soda)  Smoking status: former smoker, quit few years ago  Obstructive sleep apnea: On CPAP/BiPAP therapy  Exercise status: Walks regularly, but no formal exercise    Patient has a [id number] score of 6 [Age over 75 (2 points), Diabetes Mellitus (1 point), Hypertension (1 point), and History of stroke/TIA (2 points)]  Longterm anticoagulation is: recommended  Current anticoagulation: Apixaban  Bleeding issues reported: no  Renal function: Normal    After discussions with the patient, we proceeded with an electrophysiology study in May 2023 with finding, and successful ablation, of typical atrial flutter.  There were no procedural complications.  His HV intervals was noted to be mildly prolonged at 56 ms.  He was discharged home in good condition.  He is in sinus rhythm today.  Feels better.  Off calcium channel blockers.  He has a few PVCs which were

## 2024-12-04 ENCOUNTER — OFFICE VISIT (OUTPATIENT)
Dept: CARDIOLOGY CLINIC | Age: 79
End: 2024-12-04
Payer: COMMERCIAL

## 2024-12-04 VITALS
SYSTOLIC BLOOD PRESSURE: 118 MMHG | OXYGEN SATURATION: 99 % | HEIGHT: 75 IN | WEIGHT: 216 LBS | BODY MASS INDEX: 26.86 KG/M2 | HEART RATE: 68 BPM | DIASTOLIC BLOOD PRESSURE: 60 MMHG

## 2024-12-04 DIAGNOSIS — I48.0 PAROXYSMAL ATRIAL FIBRILLATION (HCC): Chronic | ICD-10-CM

## 2024-12-04 DIAGNOSIS — I10 PRIMARY HYPERTENSION: ICD-10-CM

## 2024-12-04 DIAGNOSIS — G47.33 OSA (OBSTRUCTIVE SLEEP APNEA): ICD-10-CM

## 2024-12-04 DIAGNOSIS — I49.3 PVC (PREMATURE VENTRICULAR CONTRACTION): ICD-10-CM

## 2024-12-04 DIAGNOSIS — I48.3 TYPICAL ATRIAL FLUTTER (HCC): Primary | ICD-10-CM

## 2024-12-04 PROCEDURE — 1123F ACP DISCUSS/DSCN MKR DOCD: CPT | Performed by: INTERNAL MEDICINE

## 2024-12-04 PROCEDURE — 99214 OFFICE O/P EST MOD 30 MIN: CPT | Performed by: INTERNAL MEDICINE

## 2024-12-04 PROCEDURE — 3078F DIAST BP <80 MM HG: CPT | Performed by: INTERNAL MEDICINE

## 2024-12-04 PROCEDURE — 3074F SYST BP LT 130 MM HG: CPT | Performed by: INTERNAL MEDICINE

## 2024-12-04 PROCEDURE — 93000 ELECTROCARDIOGRAM COMPLETE: CPT | Performed by: INTERNAL MEDICINE

## 2024-12-04 PROCEDURE — 1159F MED LIST DOCD IN RCRD: CPT | Performed by: INTERNAL MEDICINE

## 2024-12-04 ASSESSMENT — ENCOUNTER SYMPTOMS
HEMATOCHEZIA: 0
STRIDOR: 0
SLEEP DISTURBANCES DUE TO BREATHING: 0
LEFT EYE: 0
WHEEZING: 0
SNORING: 0
RIGHT EYE: 0
HEMATEMESIS: 0

## 2024-12-04 NOTE — PATIENT INSTRUCTIONS
Plan:     Continue cardiac medications as prescribed.   Make a follow up appointment with Dr. Galeana for January.  Follow up with me in 1 year.

## 2024-12-04 NOTE — PROGRESS NOTES
Assessment:     1. Atrial flutter: patient presented with persistent atrial flutter with ventricular rate mostly controlled. Appearance of his atrial flutter was highly suggestive of typical cavo-tricuspid isthmus dependent atrial flutter. Ventricular rate was reasonably well controlled with diltiazem although he had break-through episodes of rapid rates and also issues with bradycardia at times (minimum 30's).     Associated symptoms: Fatigue     History of cardioversion: No prior history of cardioversion  History of AF/AFL ablation: 2023 atrial flutter ablation  History of heart surgery/procedure: yes, coronary angiography with no intervention    Current use of anti-arrhythmic drugs: Not currently on anti-arrhythmic drugs  Previous use of anti-arrhythmic drugs: Not previously on any anti-arrhythmic drugs    Overall LV function: Normal left ventricular systolic function  Size of left atrium: Moderately dilated LA size  Significant cardiac valvular disease: No significant valve disease    Alcohol consumption: No alcohol consumption  Caffeine consumption: Mild caffeine intake (mostly soda)  Smoking status: former smoker, quit few years ago  Obstructive sleep apnea: On CPAP/BiPAP therapy  Exercise status: Walks regularly, but no formal exercise    Patient has a [id number] score of 6 [Age over 75 (2 points), Diabetes Mellitus (1 point), Hypertension (1 point), and History of stroke/TIA (2 points)]  Longterm anticoagulation is: recommended  Current anticoagulation: Apixaban  Bleeding issues reported: no  Renal function: Normal    After discussions with the patient, we proceeded with an electrophysiology study in May 2023 with finding, and successful ablation, of typical atrial flutter.  There were no procedural complications.  His HV intervals was noted to be mildly prolonged at 56 ms.  He was discharged home in good condition. Off calcium channel blockers.  He has a few PVCs which were previously noted as well. A

## 2024-12-05 ENCOUNTER — TELEPHONE (OUTPATIENT)
Dept: VASCULAR SURGERY | Age: 79
End: 2024-12-05

## 2024-12-05 ENCOUNTER — TELEPHONE (OUTPATIENT)
Dept: CARDIOLOGY CLINIC | Age: 79
End: 2024-12-05

## 2024-12-05 DIAGNOSIS — I71.9 ATHEROSCLEROTIC ULCER OF AORTA (HCC): Primary | ICD-10-CM

## 2024-12-05 DIAGNOSIS — I70.0 ATHEROSCLEROTIC ULCER OF AORTA (HCC): Primary | ICD-10-CM

## 2024-12-05 NOTE — TELEPHONE ENCOUNTER
Called patient with date and time of cta abdomin/pelvis at  for 1/7/2025, npo after midnight. pamvalente

## 2024-12-05 NOTE — TELEPHONE ENCOUNTER
Patient was seen in office with Dr. Marte 12/5/2024- Dr. Marte wanting to make sure patient gets scheduled for his follow up with Kervin (due in January) and f/u CT scan. Thanks!

## 2025-01-07 ENCOUNTER — HOSPITAL ENCOUNTER (OUTPATIENT)
Dept: CT IMAGING | Age: 80
Discharge: HOME | End: 2025-01-07
Payer: COMMERCIAL

## 2025-01-07 DIAGNOSIS — I71.9 ATHEROSCLEROTIC ULCER OF AORTA (HCC): ICD-10-CM

## 2025-01-07 DIAGNOSIS — I70.0 ATHEROSCLEROTIC ULCER OF AORTA (HCC): ICD-10-CM

## 2025-01-07 LAB
PERFORMED ON: NORMAL
POC CREATININE: 1.2 MG/DL (ref 0.8–1.3)
POC SAMPLE TYPE: NORMAL

## 2025-01-07 PROCEDURE — 74174 CTA ABD&PLVS W/CONTRAST: CPT

## 2025-01-07 PROCEDURE — 82565 ASSAY OF CREATININE: CPT

## 2025-01-07 PROCEDURE — 6360000004 HC RX CONTRAST MEDICATION: Performed by: SURGERY

## 2025-01-07 RX ORDER — IOPAMIDOL 755 MG/ML
75 INJECTION, SOLUTION INTRAVASCULAR
Status: COMPLETED | OUTPATIENT
Start: 2025-01-07 | End: 2025-01-07

## 2025-01-07 RX ADMIN — IOPAMIDOL 75 ML: 755 INJECTION, SOLUTION INTRAVENOUS at 15:30

## 2025-01-09 ENCOUNTER — TELEPHONE (OUTPATIENT)
Dept: VASCULAR SURGERY | Age: 80
End: 2025-01-09

## 2025-01-09 DIAGNOSIS — I70.0 ATHEROSCLEROTIC ULCER OF AORTA (HCC): Primary | ICD-10-CM

## 2025-01-09 DIAGNOSIS — I71.9 ATHEROSCLEROTIC ULCER OF AORTA (HCC): Primary | ICD-10-CM

## 2025-01-09 NOTE — TELEPHONE ENCOUNTER
Called patient per Dr Galeana, Little change, continues to show ulcerated plaque.  Recc repeat in 2 years. Pamlr

## (undated) DEVICE — AIRSEAL BIFURCATED SMOKE EVAC FILTERED TUBE SET: Brand: AIRSEAL

## (undated) DEVICE — FENESTRATED BIPOLAR FORCEPS: Brand: ENDOWRIST

## (undated) DEVICE — TISSUE RETRIEVAL SYSTEM: Brand: INZII RETRIEVAL SYSTEM

## (undated) DEVICE — CATHETER CHOLGM 4.5FR L18IN W/ MTL SUPP TB

## (undated) DEVICE — MEDIUM-LARGE CLIP APPLIER: Brand: ENDOWRIST

## (undated) DEVICE — REDUCER: Brand: ENDOWRIST

## (undated) DEVICE — DRAPE C ARM W46XL120IN XLN

## (undated) DEVICE — GLOVE,SURG,SENSICARE SLT,LF,PF,7.5: Brand: MEDLINE

## (undated) DEVICE — COLUMN DRAPE

## (undated) DEVICE — 35 ML SYRINGE LUER-LOCK TIP: Brand: MONOJECT

## (undated) DEVICE — STOPCOCK ANGIO 1050PSI DK BLU R ROT M LUER 3 W OFF HNDL

## (undated) DEVICE — SUTURE VICRYL + SZ 3-0 L18IN ABSRB UD SH 1/2 CIR TAPERCUT NDL VCP864D

## (undated) DEVICE — PERMANENT CAUTERY HOOK: Brand: ENDOWRIST

## (undated) DEVICE — TROCAR: Brand: KII FIOS FIRST ENTRY

## (undated) DEVICE — SEAL

## (undated) DEVICE — Device

## (undated) DEVICE — ARM DRAPE

## (undated) DEVICE — SUTURE VICRYL + SZ 0 L27IN ABSRB VLT L26MM UR-6 5/8 CIR VCP603H

## (undated) DEVICE — SOLUTION IRRIG 1000ML STRL H2O USP PLAS POUR BTL

## (undated) DEVICE — LIQUIBAND RAPID ADHESIVE 36/CS 0.8ML: Brand: MEDLINE